# Patient Record
Sex: MALE | Race: OTHER | HISPANIC OR LATINO | Employment: FULL TIME | URBAN - METROPOLITAN AREA
[De-identification: names, ages, dates, MRNs, and addresses within clinical notes are randomized per-mention and may not be internally consistent; named-entity substitution may affect disease eponyms.]

---

## 2022-10-27 ENCOUNTER — OFFICE VISIT (OUTPATIENT)
Dept: INTERNAL MEDICINE CLINIC | Facility: CLINIC | Age: 32
End: 2022-10-27

## 2022-10-27 VITALS
HEIGHT: 71 IN | HEART RATE: 90 BPM | OXYGEN SATURATION: 98 % | BODY MASS INDEX: 27.41 KG/M2 | SYSTOLIC BLOOD PRESSURE: 110 MMHG | WEIGHT: 195.8 LBS | DIASTOLIC BLOOD PRESSURE: 80 MMHG | TEMPERATURE: 97.8 F

## 2022-10-27 DIAGNOSIS — F32.A DEPRESSION, UNSPECIFIED DEPRESSION TYPE: ICD-10-CM

## 2022-10-27 DIAGNOSIS — Z11.4 SCREENING FOR HIV (HUMAN IMMUNODEFICIENCY VIRUS): ICD-10-CM

## 2022-10-27 DIAGNOSIS — Z11.59 ENCOUNTER FOR HEPATITIS C SCREENING TEST FOR LOW RISK PATIENT: ICD-10-CM

## 2022-10-27 DIAGNOSIS — Z23 NEED FOR TDAP VACCINATION: ICD-10-CM

## 2022-10-27 DIAGNOSIS — Z13.228 SCREENING FOR METABOLIC DISORDER: ICD-10-CM

## 2022-10-27 DIAGNOSIS — Z30.09 VASECTOMY EVALUATION: ICD-10-CM

## 2022-10-27 RX ORDER — BUPROPION HYDROCHLORIDE 150 MG/1
150 TABLET ORAL EVERY MORNING
Qty: 90 TABLET | Refills: 3 | Status: SHIPPED | OUTPATIENT
Start: 2022-10-27

## 2022-10-27 RX ORDER — HYDROXYZINE HYDROCHLORIDE 25 MG/1
25 TABLET, FILM COATED ORAL EVERY 6 HOURS PRN
Qty: 30 TABLET | Refills: 0 | Status: SHIPPED | OUTPATIENT
Start: 2022-10-27

## 2022-10-27 NOTE — PROGRESS NOTES
Assessment/Plan:    Depression  Will start Wellbutrin, as need Atarax for anxiety  Advised to not abruptly stop this medication  Follow up in one month or sooner if needed  Vasectomy evaluation  Will place referral to urology  Screening for metabolic disorder  Will get fasting blood work  BMI Counseling: Body mass index is 27 56 kg/m²  The BMI is above normal  Nutrition recommendations include decreasing portion sizes, encouraging healthy choices of fruits and vegetables, decreasing fast food intake, consuming healthier snacks, limiting drinks that contain sugar, moderation in carbohydrate intake, increasing intake of lean protein, reducing intake of saturated and trans fat and reducing intake of cholesterol  Exercise recommendations include moderate physical activity 150 minutes/week and exercising 3-5 times per week  Rationale for BMI follow-up plan is due to patient being overweight or obese  Depression Screening and Follow-up Plan: Patient was screened for depression during today's encounter  They screened negative with a PHQ-2 score of 2  Diagnoses and all orders for this visit:    Screening for metabolic disorder  -     CBC and differential  -     Comprehensive metabolic panel; Future  -     Lipid panel    Screening for HIV (human immunodeficiency virus)  -     HIV 1/2 Antigen/Antibody (4th Generation) w Reflex [facility]; Future    Encounter for hepatitis C screening test for low risk patient  -     Hepatitis C antibody; Future    Vasectomy evaluation  -     Ambulatory Referral to Urology; Future    Depression, unspecified depression type  -     buPROPion (Wellbutrin XL) 150 mg 24 hr tablet; Take 1 tablet (150 mg total) by mouth every morning  -     hydrOXYzine HCL (ATARAX) 25 mg tablet; Take 1 tablet (25 mg total) by mouth every 6 (six) hours as needed for anxiety          Subjective:      Patient ID: Yelena Gomez is a 29 y o  male      Patient presents today to establish care with our practice  Denies any significant past medical history  Current smoker  Social alcohol abuse  He has not been exercising lately  Well balanced diet  He reports that he is having trouble focusing at work, he reports that he has trouble finishing tasks a times  He does reports some depression and the feelings of be overwhelemed at times due to his moms health  This tends to effect his sleep and his attendance at work  The following portions of the patient's history were reviewed and updated as appropriate: allergies, current medications, past family history, past medical history, past social history, past surgical history and problem list     Review of Systems   Constitutional: Negative for activity change, appetite change, chills, diaphoresis and fever  HENT: Negative for congestion, ear discharge, ear pain, postnasal drip, rhinorrhea, sinus pressure, sinus pain and sore throat  Eyes: Negative for pain, discharge, itching and visual disturbance  Respiratory: Negative for cough, chest tightness, shortness of breath and wheezing  Cardiovascular: Negative for chest pain, palpitations and leg swelling  Gastrointestinal: Negative for abdominal pain, constipation, diarrhea, nausea and vomiting  Endocrine: Negative for polydipsia, polyphagia and polyuria  Genitourinary: Negative for difficulty urinating, dysuria and urgency  Musculoskeletal: Negative for arthralgias, back pain and neck pain  Skin: Negative for rash and wound  Neurological: Negative for dizziness, weakness, numbness and headaches  Psychiatric/Behavioral: Positive for dysphoric mood and sleep disturbance  The patient is nervous/anxious  History reviewed  No pertinent past medical history        Current Outpatient Medications:   •  buPROPion (Wellbutrin XL) 150 mg 24 hr tablet, Take 1 tablet (150 mg total) by mouth every morning, Disp: 90 tablet, Rfl: 3  •  hydrOXYzine HCL (ATARAX) 25 mg tablet, Take 1 tablet (25 mg total) by mouth every 6 (six) hours as needed for anxiety, Disp: 30 tablet, Rfl: 0  •  ibuprofen (MOTRIN) 600 mg tablet, Take 1 tablet (600 mg total) by mouth every 6 (six) hours as needed for mild pain or moderate pain  (Patient not taking: No sig reported), Disp: 30 tablet, Rfl: 0    No Known Allergies    Social History   Past Surgical History:   Procedure Laterality Date   • NO PAST SURGERIES       Family History   Problem Relation Age of Onset   • Stroke Mother    • No Known Problems Father    • No Known Problems Sister        Objective:  /80 (BP Location: Left arm, Patient Position: Sitting, Cuff Size: Standard)   Pulse 90   Temp 97 8 °F (36 6 °C) (Temporal)   Ht 5' 10 67" (1 795 m)   Wt 88 8 kg (195 lb 12 8 oz)   SpO2 98%   BMI 27 56 kg/m²     No results found for this or any previous visit (from the past 1344 hour(s))  Physical Exam  Constitutional:       General: He is not in acute distress  Appearance: He is well-developed  He is not diaphoretic  HENT:      Head: Normocephalic and atraumatic  Right Ear: External ear normal       Left Ear: External ear normal       Nose: Nose normal       Mouth/Throat:      Pharynx: No oropharyngeal exudate  Eyes:      General:         Right eye: No discharge  Left eye: No discharge  Conjunctiva/sclera: Conjunctivae normal       Pupils: Pupils are equal, round, and reactive to light  Neck:      Thyroid: No thyromegaly  Cardiovascular:      Rate and Rhythm: Normal rate and regular rhythm  Heart sounds: Normal heart sounds  No murmur heard  No friction rub  No gallop  Pulmonary:      Effort: Pulmonary effort is normal  No respiratory distress  Breath sounds: Normal breath sounds  No stridor  No wheezing or rales  Abdominal:      General: Bowel sounds are normal  There is no distension  Palpations: Abdomen is soft  Tenderness: There is no abdominal tenderness     Musculoskeletal: Cervical back: Normal range of motion and neck supple  Lymphadenopathy:      Cervical: No cervical adenopathy  Skin:     General: Skin is warm and dry  Findings: No erythema or rash  Neurological:      Mental Status: He is alert and oriented to person, place, and time  Psychiatric:         Behavior: Behavior normal          Thought Content:  Thought content normal          Judgment: Judgment normal

## 2022-10-27 NOTE — ASSESSMENT & PLAN NOTE
Will start Wellbutrin, as need Atarax for anxiety  Advised to not abruptly stop this medication  Follow up in one month or sooner if needed

## 2022-11-07 ENCOUNTER — APPOINTMENT (OUTPATIENT)
Dept: LAB | Age: 32
End: 2022-11-07

## 2022-11-07 DIAGNOSIS — Z13.228 SCREENING FOR METABOLIC DISORDER: ICD-10-CM

## 2022-11-07 DIAGNOSIS — Z11.59 ENCOUNTER FOR HEPATITIS C SCREENING TEST FOR LOW RISK PATIENT: ICD-10-CM

## 2022-11-07 DIAGNOSIS — Z11.4 SCREENING FOR HIV (HUMAN IMMUNODEFICIENCY VIRUS): ICD-10-CM

## 2022-11-07 LAB
ALBUMIN SERPL BCP-MCNC: 3.9 G/DL (ref 3.5–5)
ALP SERPL-CCNC: 70 U/L (ref 46–116)
ALT SERPL W P-5'-P-CCNC: 46 U/L (ref 12–78)
ANION GAP SERPL CALCULATED.3IONS-SCNC: 4 MMOL/L (ref 4–13)
AST SERPL W P-5'-P-CCNC: 18 U/L (ref 5–45)
BASOPHILS # BLD AUTO: 0.06 THOUSANDS/ÂΜL (ref 0–0.1)
BASOPHILS NFR BLD AUTO: 1 % (ref 0–1)
BILIRUB SERPL-MCNC: 0.34 MG/DL (ref 0.2–1)
BUN SERPL-MCNC: 11 MG/DL (ref 5–25)
CALCIUM SERPL-MCNC: 9.4 MG/DL (ref 8.3–10.1)
CHLORIDE SERPL-SCNC: 107 MMOL/L (ref 96–108)
CHOLEST SERPL-MCNC: 194 MG/DL
CO2 SERPL-SCNC: 28 MMOL/L (ref 21–32)
CREAT SERPL-MCNC: 1.08 MG/DL (ref 0.6–1.3)
EOSINOPHIL # BLD AUTO: 0.38 THOUSAND/ÂΜL (ref 0–0.61)
EOSINOPHIL NFR BLD AUTO: 6 % (ref 0–6)
ERYTHROCYTE [DISTWIDTH] IN BLOOD BY AUTOMATED COUNT: 11.8 % (ref 11.6–15.1)
GFR SERPL CREATININE-BSD FRML MDRD: 91 ML/MIN/1.73SQ M
GLUCOSE SERPL-MCNC: 86 MG/DL (ref 65–140)
HCT VFR BLD AUTO: 43.2 % (ref 36.5–49.3)
HCV AB SER QL: NORMAL
HDLC SERPL-MCNC: 40 MG/DL
HGB BLD-MCNC: 14.2 G/DL (ref 12–17)
IMM GRANULOCYTES # BLD AUTO: 0.01 THOUSAND/UL (ref 0–0.2)
IMM GRANULOCYTES NFR BLD AUTO: 0 % (ref 0–2)
LDLC SERPL CALC-MCNC: 105 MG/DL (ref 0–100)
LYMPHOCYTES # BLD AUTO: 1.97 THOUSANDS/ÂΜL (ref 0.6–4.47)
LYMPHOCYTES NFR BLD AUTO: 30 % (ref 14–44)
MCH RBC QN AUTO: 30 PG (ref 26.8–34.3)
MCHC RBC AUTO-ENTMCNC: 32.9 G/DL (ref 31.4–37.4)
MCV RBC AUTO: 91 FL (ref 82–98)
MONOCYTES # BLD AUTO: 0.55 THOUSAND/ÂΜL (ref 0.17–1.22)
MONOCYTES NFR BLD AUTO: 8 % (ref 4–12)
NEUTROPHILS # BLD AUTO: 3.65 THOUSANDS/ÂΜL (ref 1.85–7.62)
NEUTS SEG NFR BLD AUTO: 55 % (ref 43–75)
NONHDLC SERPL-MCNC: 154 MG/DL
NRBC BLD AUTO-RTO: 0 /100 WBCS
PLATELET # BLD AUTO: 411 THOUSANDS/UL (ref 149–390)
PMV BLD AUTO: 10 FL (ref 8.9–12.7)
POTASSIUM SERPL-SCNC: 4.2 MMOL/L (ref 3.5–5.3)
PROT SERPL-MCNC: 7.7 G/DL (ref 6.4–8.4)
RBC # BLD AUTO: 4.74 MILLION/UL (ref 3.88–5.62)
SODIUM SERPL-SCNC: 139 MMOL/L (ref 135–147)
TRIGL SERPL-MCNC: 246 MG/DL
WBC # BLD AUTO: 6.62 THOUSAND/UL (ref 4.31–10.16)

## 2022-11-08 LAB — HIV 1+2 AB+HIV1 P24 AG SERPL QL IA: NORMAL

## 2022-11-28 ENCOUNTER — TELEMEDICINE (OUTPATIENT)
Dept: INTERNAL MEDICINE CLINIC | Facility: CLINIC | Age: 32
End: 2022-11-28

## 2022-11-28 DIAGNOSIS — E78.2 MIXED HYPERLIPIDEMIA: ICD-10-CM

## 2022-11-28 DIAGNOSIS — F32.A DEPRESSION, UNSPECIFIED DEPRESSION TYPE: Primary | ICD-10-CM

## 2022-11-28 NOTE — PROGRESS NOTES
Virtual Regular Visit    Verification of patient location:    Patient is located in the following state in which I hold an active license [place]      Assessment/Plan:    Problem List Items Addressed This Visit        Other    Depression - Primary     Well controlled on Wellbutrin, continue with Atarax as needed  Mixed hyperlipidemia     Recommended starting fish oil  Recommend healthy lifestyle choices for your cholesterol  Low fat/low cholesterol diet  Limit/avoid red meat  Eat more lean meat - chicken breast, ground turkey, fish  Exercise 30 mins at least 5 times a week as tolerated  Reason for visit is   Chief Complaint   Patient presents with   • Virtual Regular Visit     1 month follow up review labs done 11/7   • Virtual Regular Visit        Encounter provider SAMEERA Duque    Provider located at [address] [address]      Recent Visits  No visits were found meeting these conditions  Showing recent visits within past 7 days and meeting all other requirements  Today's Visits  Date Type Provider Dept   11/28/22 SAMEERA Torre [facility]   Showing today's visits and meeting all other requirements  Future Appointments  No visits were found meeting these conditions  Showing future appointments within next 150 days and meeting all other requirements       The patient was identified by name and date of birth  Speedy Knox was informed that this is a telemedicine visit and that the visit is being conducted through the [address] [employer] platform  He agrees to proceed     My office door was closed  No one else was in the room  He acknowledged consent and understanding of privacy and security of the video platform   The patient has agreed to participate and understands they can discontinue the visit at any time     Patient is aware this is a billable service  Subjective  Jaime Young is a 29 y o  male    Patient calls in today to review blood work and to follow up on anxiety/depression  Current smoker  Social alcohol abuse  He has not been exercising lately  Well balanced diet  Anxiety/depression-Note from last office visit:He reports that he is having trouble focusing at work, he reports that he has trouble finishing tasks a times  He does reports some depression and the feelings of be overwhelemed at times due to his moms health  This tends to effect his sleep and his attendance at work  He reports that since starting Wellbutrin he has been smoking blasts, his sleep is better and he feels that his focus at work has improved, he is not feeling as overwhelmed,  Denies side effects with this medication  History reviewed  No pertinent past medical history  Past Surgical History:   Procedure Laterality Date   • NO PAST SURGERIES         Current Outpatient Medications   Medication Sig Dispense Refill   • buPROPion (Wellbutrin XL) 150 mg 24 hr tablet Take 1 tablet (150 mg total) by mouth every morning 90 tablet 3   • hydrOXYzine HCL (ATARAX) 25 mg tablet Take 1 tablet (25 mg total) by mouth every 6 (six) hours as needed for anxiety 30 tablet 0     No current facility-administered medications for this visit  No Known Allergies    Review of Systems   Constitutional: Negative for activity change, appetite change, chills, diaphoresis and fever  HENT: Negative for congestion, ear discharge, ear pain, postnasal drip, rhinorrhea, sinus pressure, sinus pain and sore throat  Eyes: Negative for pain, discharge, itching and visual disturbance  Respiratory: Negative for cough, chest tightness, shortness of breath and wheezing  Cardiovascular: Negative for chest pain, palpitations and leg swelling     Gastrointestinal: Negative for abdominal pain, constipation, diarrhea, nausea and vomiting  Endocrine: Negative for polydipsia, polyphagia and polyuria  Genitourinary: Negative for difficulty urinating, dysuria and urgency  Musculoskeletal: Negative for arthralgias, back pain and neck pain  Skin: Negative for rash and wound  Neurological: Negative for dizziness, weakness, numbness and headaches  Psychiatric/Behavioral: Negative for dysphoric mood  The patient is not nervous/anxious  Video Exam    There were no vitals filed for this visit  Physical Exam  Vitals reviewed  HENT:      Head: Normocephalic and atraumatic  Eyes:      General: No scleral icterus  Pulmonary:      Effort: No respiratory distress  Neurological:      Mental Status: He is oriented to person, place, and time  Psychiatric:         Mood and Affect: Mood normal          Behavior: Behavior normal          Thought Content:  Thought content normal          Judgment: Judgment normal           I spent 15 minutes directly with the patient during this visit

## 2022-11-28 NOTE — ASSESSMENT & PLAN NOTE
Recommended starting fish oil  Recommend healthy lifestyle choices for your cholesterol  Low fat/low cholesterol diet  Limit/avoid red meat  Eat more lean meat - chicken breast, ground turkey, fish  Exercise 30 mins at least 5 times a week as tolerated

## 2023-05-19 ENCOUNTER — OFFICE VISIT (OUTPATIENT)
Dept: INTERNAL MEDICINE CLINIC | Facility: CLINIC | Age: 33
End: 2023-05-19

## 2023-05-19 VITALS
TEMPERATURE: 98 F | WEIGHT: 190.4 LBS | HEART RATE: 76 BPM | HEIGHT: 71 IN | BODY MASS INDEX: 26.65 KG/M2 | OXYGEN SATURATION: 98 % | DIASTOLIC BLOOD PRESSURE: 80 MMHG | SYSTOLIC BLOOD PRESSURE: 104 MMHG

## 2023-05-19 DIAGNOSIS — R51.9 ACUTE NONINTRACTABLE HEADACHE, UNSPECIFIED HEADACHE TYPE: Primary | ICD-10-CM

## 2023-05-19 DIAGNOSIS — F90.9 ATTENTION DEFICIT HYPERACTIVITY DISORDER (ADHD), UNSPECIFIED ADHD TYPE: ICD-10-CM

## 2023-05-19 DIAGNOSIS — F51.01 PRIMARY INSOMNIA: ICD-10-CM

## 2023-05-19 DIAGNOSIS — Z87.891 FORMER SMOKER: ICD-10-CM

## 2023-05-19 DIAGNOSIS — F32.A DEPRESSION, UNSPECIFIED DEPRESSION TYPE: ICD-10-CM

## 2023-05-19 RX ORDER — DEXTROAMPHETAMINE SACCHARATE, AMPHETAMINE ASPARTATE MONOHYDRATE, DEXTROAMPHETAMINE SULFATE AND AMPHETAMINE SULFATE 1.25; 1.25; 1.25; 1.25 MG/1; MG/1; MG/1; MG/1
5 CAPSULE, EXTENDED RELEASE ORAL EVERY MORNING
Qty: 30 CAPSULE | Refills: 0 | Status: SHIPPED | OUTPATIENT
Start: 2023-05-19

## 2023-05-19 RX ORDER — BUPROPION HYDROCHLORIDE 300 MG/1
300 TABLET ORAL EVERY MORNING
Qty: 90 TABLET | Refills: 1 | Status: SHIPPED | OUTPATIENT
Start: 2023-05-19

## 2023-05-19 NOTE — PROGRESS NOTES
Assessment/Plan:    Depression  Uncontrolled, increase Wellbutrin to 300 mg tablet daily, continue with Atarax as needed  Attention deficit hyperactivity disorder (ADHD)  Based off screening tool will start Adderall 5 mg extended release  Follow-up with patient in 1 month or sooner if needed  Controlled substance agreement signed while in office today  Primary insomnia  Melatonin at night    Former smoker  Encouraged continued cessation      BMI Counseling: Body mass index is 26 6 kg/m²  The BMI is above normal  Nutrition recommendations include decreasing portion sizes, encouraging healthy choices of fruits and vegetables, decreasing fast food intake, consuming healthier snacks, limiting drinks that contain sugar, moderation in carbohydrate intake, increasing intake of lean protein, reducing intake of saturated and trans fat and reducing intake of cholesterol  Exercise recommendations include moderate physical activity 150 minutes/week and exercising 3-5 times per week  Rationale for BMI follow-up plan is due to patient being overweight or obese  Diagnoses and all orders for this visit:    Acute nonintractable headache, unspecified headache type  -     MRI brain w wo contrast; Future    Depression, unspecified depression type  -     buPROPion (WELLBUTRIN XL) 300 mg 24 hr tablet; Take 1 tablet (300 mg total) by mouth every morning    Attention deficit hyperactivity disorder (ADHD), unspecified ADHD type  -     amphetamine-dextroamphetamine (ADDERALL XR, 5MG,) 5 MG 24 hr capsule; Take 1 capsule (5 mg total) by mouth every morning Max Daily Amount: 5 mg    Primary insomnia    Former smoker          Subjective:      Patient ID: Scott Stoddard is a 29 y o  male  Patient presents today to follow up on anxiety/depression  Former smoker- recently quit  Social alcohol abuse  He has not been exercising lately  Well balanced diet       Anxiety/depression-Note from last office visit:He reports that he is having trouble focusing at work, he reports that he has trouble finishing tasks a times  He does reports some depression and the feelings of be overwhelemed at times due to his moms health  This tends to effect his sleep and his attendance at work  He has been on Wellbutrin without any side effects however he does not feel that his depression is fully controlled at this time  Denies side effects with this medication  Has been getting a sharp pain to the left side of his head daily for the past three months, in the temporal region, he reports that it last about 5-10 seconds and then it goes away  Denies changes to vision  Denies injury to head and denies concussion         The following portions of the patient's history were reviewed and updated as appropriate: allergies, current medications, past family history, past medical history, past social history, past surgical history and problem list     Review of Systems   Constitutional: Negative for activity change, appetite change, chills, diaphoresis and fever  HENT: Negative for congestion, ear discharge, ear pain, postnasal drip, rhinorrhea, sinus pressure, sinus pain and sore throat  Eyes: Negative for pain, discharge, itching and visual disturbance  Respiratory: Negative for cough, chest tightness, shortness of breath and wheezing  Cardiovascular: Negative for chest pain, palpitations and leg swelling  Gastrointestinal: Negative for abdominal pain, constipation, diarrhea, nausea and vomiting  Endocrine: Negative for polydipsia, polyphagia and polyuria  Genitourinary: Negative for difficulty urinating, dysuria and urgency  Musculoskeletal: Negative for arthralgias, back pain and neck pain  Skin: Negative for rash and wound  Neurological: Positive for headaches  Negative for dizziness, weakness and numbness  Psychiatric/Behavioral: Positive for decreased concentration and dysphoric mood  The patient is not nervous/anxious  "         History reviewed  No pertinent past medical history  Current Outpatient Medications:   •  amphetamine-dextroamphetamine (ADDERALL XR, 5MG,) 5 MG 24 hr capsule, Take 1 capsule (5 mg total) by mouth every morning Max Daily Amount: 5 mg, Disp: 30 capsule, Rfl: 0  •  buPROPion (WELLBUTRIN XL) 300 mg 24 hr tablet, Take 1 tablet (300 mg total) by mouth every morning, Disp: 90 tablet, Rfl: 1  •  hydrOXYzine HCL (ATARAX) 25 mg tablet, Take 1 tablet (25 mg total) by mouth every 6 (six) hours as needed for anxiety, Disp: 30 tablet, Rfl: 0    No Known Allergies    Social History   Past Surgical History:   Procedure Laterality Date   • NO PAST SURGERIES       Family History   Problem Relation Age of Onset   • Stroke Mother    • No Known Problems Father    • No Known Problems Sister        Objective:  /80 (BP Location: Left arm, Patient Position: Sitting, Cuff Size: Standard)   Pulse 76   Temp 98 °F (36 7 °C) (Temporal)   Ht 5' 10 95\" (1 802 m)   Wt 86 4 kg (190 lb 6 4 oz)   SpO2 98%   BMI 26 60 kg/m²     No results found for this or any previous visit (from the past 1344 hour(s))  Physical Exam  Constitutional:       General: He is not in acute distress  Appearance: He is well-developed  He is not diaphoretic  HENT:      Head: Normocephalic and atraumatic  Right Ear: External ear normal       Left Ear: External ear normal       Nose: Nose normal       Mouth/Throat:      Pharynx: No oropharyngeal exudate  Eyes:      General:         Right eye: No discharge  Left eye: No discharge  Conjunctiva/sclera: Conjunctivae normal       Pupils: Pupils are equal, round, and reactive to light  Neck:      Thyroid: No thyromegaly  Cardiovascular:      Rate and Rhythm: Normal rate and regular rhythm  Heart sounds: Normal heart sounds  No murmur heard  No friction rub  No gallop  Pulmonary:      Effort: Pulmonary effort is normal  No respiratory distress        Breath " sounds: Normal breath sounds  No stridor  No wheezing or rales  Abdominal:      General: Bowel sounds are normal  There is no distension  Palpations: Abdomen is soft  Tenderness: There is no abdominal tenderness  Musculoskeletal:      Cervical back: Normal range of motion and neck supple  Lymphadenopathy:      Cervical: No cervical adenopathy  Skin:     General: Skin is warm and dry  Findings: No erythema or rash  Neurological:      Mental Status: He is alert and oriented to person, place, and time  Psychiatric:         Behavior: Behavior normal          Thought Content:  Thought content normal          Judgment: Judgment normal

## 2023-05-19 NOTE — ASSESSMENT & PLAN NOTE
Based off screening tool will start Adderall 5 mg extended release  Follow-up with patient in 1 month or sooner if needed  Controlled substance agreement signed while in office today

## 2023-06-14 DIAGNOSIS — F90.9 ATTENTION DEFICIT HYPERACTIVITY DISORDER (ADHD), UNSPECIFIED ADHD TYPE: ICD-10-CM

## 2023-06-14 RX ORDER — DEXTROAMPHETAMINE SACCHARATE, AMPHETAMINE ASPARTATE MONOHYDRATE, DEXTROAMPHETAMINE SULFATE AND AMPHETAMINE SULFATE 1.25; 1.25; 1.25; 1.25 MG/1; MG/1; MG/1; MG/1
5 CAPSULE, EXTENDED RELEASE ORAL EVERY MORNING
Qty: 30 CAPSULE | Refills: 0 | Status: SHIPPED | OUTPATIENT
Start: 2023-06-14

## 2023-06-14 NOTE — TELEPHONE ENCOUNTER
----- Message from [id number] [address]  Deepika [place] sent at 6/14/2023 10:20 AM EDT -----  Regarding: Medication refills   Contact: [phone]  Jonah Reyes, I was wondering if my Adderall prescription is set up for refills? I only ask because the bottle says zero refills and I have 3 days left of supply  I think this has massively helped me in my personal and work life  So if possible I would like to continue taking this medication       Thank you,   Warden Beltran

## 2023-07-14 ENCOUNTER — TELEMEDICINE (OUTPATIENT)
Dept: INTERNAL MEDICINE CLINIC | Facility: CLINIC | Age: 33
End: 2023-07-14
Payer: COMMERCIAL

## 2023-07-14 DIAGNOSIS — F90.9 ATTENTION DEFICIT HYPERACTIVITY DISORDER (ADHD), UNSPECIFIED ADHD TYPE: ICD-10-CM

## 2023-07-14 DIAGNOSIS — F32.A DEPRESSION, UNSPECIFIED DEPRESSION TYPE: Primary | ICD-10-CM

## 2023-07-14 PROCEDURE — 99213 OFFICE O/P EST LOW 20 MIN: CPT | Performed by: NURSE PRACTITIONER

## 2023-07-14 RX ORDER — DEXTROAMPHETAMINE SACCHARATE, AMPHETAMINE ASPARTATE MONOHYDRATE, DEXTROAMPHETAMINE SULFATE AND AMPHETAMINE SULFATE 2.5; 2.5; 2.5; 2.5 MG/1; MG/1; MG/1; MG/1
10 CAPSULE, EXTENDED RELEASE ORAL EVERY MORNING
Qty: 30 CAPSULE | Refills: 0 | Status: SHIPPED | OUTPATIENT
Start: 2023-07-14

## 2023-07-14 NOTE — ASSESSMENT & PLAN NOTE
Improving, however will increase Adderall to 10 mg tablet extended release  Follow-up in 3 months or sooner if needed  Controlled substance agreement on file

## 2023-07-14 NOTE — PROGRESS NOTES
Virtual Regular Visit    Verification of patient location:    Patient is located at Home in the following state in which I hold an active license [place]      Assessment/Plan:    Problem List Items Addressed This Visit        Other    Depression - Primary     Controlled on Wellbutrin half milligram tablet daily 300 mg daily, continue with Atarax as needed         Attention deficit hyperactivity disorder (ADHD)     Improving, however will increase Adderall to 10 mg tablet extended release  Follow-up in 3 months or sooner if needed  Controlled substance agreement on file                  Reason for visit is   Chief Complaint   Patient presents with   • Virtual regaular visit     1 month follow adhd and antidepressants   • Virtual Regular Visit        Encounter provider SAMEERA Montejo    Provider located at [address] [place] [address].  [address]      Recent Visits  No visits were found meeting these conditions. Showing recent visits within past 7 days and meeting all other requirements  Today's Visits  Date Type Provider Dept   07/14/23 4093 SAMEERA Kothari [facility]   Showing today's visits and meeting all other requirements  Future Appointments  No visits were found meeting these conditions. Showing future appointments within next 150 days and meeting all other requirements       The patient was identified by name and date of birth. Oswald Marcum was informed that this is a telemedicine visit and that the visit is being conducted through the [employer]. He agrees to proceed. .  My office door was closed. No one else was in the room. He acknowledged consent and understanding of privacy and security of the video platform. The patient has agreed to participate and understands they can discontinue the visit at any time.     Patient is aware this is a [facility] service. Subjective  Linh Esquivel is a 29 y.o. male  . Patient presents today to follow up on anxiety/depression. Former smoker- recently quit  Social alcohol abuse. He has not been exercising lately. Well balanced diet. Anxiety/depression-Note from last office visit:He reports that he is having trouble focusing at work, he reports that he has trouble finishing tasks a times. He does reports some depression and the feelings of be overwhelemed at times due to his moms health. This tends to effect his sleep and his attendance at work. Last office visit we increased his Wellbutrin to 300 mg and in regards to depression and anxiety he feels well controlled, we had also started him on Adderall 5 mg tablet daily, he reports a decrease in his appetite however is still eating appropriately, denies any insomnia    Reports overall since starting Adderall he is notices significant improvement in staying focused however he noticed that since he has been taking it and has been not as effective    He reports that since taking the Wellbutrin consistently he is no longer getting that sharp pain to the left side of his head       History reviewed. No pertinent past medical history. Past Surgical History:   Procedure Laterality Date   • NO PAST SURGERIES         Current Outpatient Medications   Medication Sig Dispense Refill   • buPROPion (WELLBUTRIN XL) 300 mg 24 hr tablet Take 1 tablet (300 mg total) by mouth every morning 90 tablet 1   • hydrOXYzine HCL (ATARAX) 25 mg tablet Take 1 tablet (25 mg total) by mouth every 6 (six) hours as needed for anxiety 30 tablet 0     No current facility-administered medications for this visit. No Known Allergies    Review of Systems   Constitutional: Negative for activity change, appetite change, chills, diaphoresis and fever.    HENT: Negative for congestion, ear discharge, ear pain, postnasal drip, rhinorrhea, sinus pressure, sinus pain and sore throat. Eyes: Negative for pain, discharge, itching and visual disturbance. Respiratory: Negative for cough, chest tightness, shortness of breath and wheezing. Cardiovascular: Negative for chest pain, palpitations and leg swelling. Gastrointestinal: Negative for abdominal pain, constipation, diarrhea, nausea and vomiting. Endocrine: Negative for polydipsia, polyphagia and polyuria. Genitourinary: Negative for difficulty urinating, dysuria and urgency. Musculoskeletal: Negative for arthralgias, back pain and neck pain. Skin: Negative for rash and wound. Neurological: Negative for dizziness, weakness, numbness and headaches. Psychiatric/Behavioral: Negative for decreased concentration and dysphoric mood. The patient is not nervous/anxious. Video Exam    There were no vitals filed for this visit.     Physical Exam     Visit Time  Total Visit Duration: 15

## 2023-08-17 ENCOUNTER — PATIENT MESSAGE (OUTPATIENT)
Dept: INTERNAL MEDICINE CLINIC | Facility: CLINIC | Age: 33
End: 2023-08-17

## 2023-08-17 DIAGNOSIS — F90.9 ATTENTION DEFICIT HYPERACTIVITY DISORDER (ADHD), UNSPECIFIED ADHD TYPE: ICD-10-CM

## 2023-08-18 RX ORDER — DEXTROAMPHETAMINE SACCHARATE, AMPHETAMINE ASPARTATE MONOHYDRATE, DEXTROAMPHETAMINE SULFATE AND AMPHETAMINE SULFATE 2.5; 2.5; 2.5; 2.5 MG/1; MG/1; MG/1; MG/1
10 CAPSULE, EXTENDED RELEASE ORAL EVERY MORNING
Qty: 30 CAPSULE | Refills: 0 | Status: SHIPPED | OUTPATIENT
Start: 2023-08-18

## 2023-09-16 DIAGNOSIS — F90.9 ATTENTION DEFICIT HYPERACTIVITY DISORDER (ADHD), UNSPECIFIED ADHD TYPE: ICD-10-CM

## 2023-09-18 RX ORDER — DEXTROAMPHETAMINE SACCHARATE, AMPHETAMINE ASPARTATE MONOHYDRATE, DEXTROAMPHETAMINE SULFATE AND AMPHETAMINE SULFATE 2.5; 2.5; 2.5; 2.5 MG/1; MG/1; MG/1; MG/1
10 CAPSULE, EXTENDED RELEASE ORAL EVERY MORNING
Qty: 30 CAPSULE | Refills: 0 | Status: SHIPPED | OUTPATIENT
Start: 2023-09-18

## 2023-10-13 DIAGNOSIS — F90.9 ATTENTION DEFICIT HYPERACTIVITY DISORDER (ADHD), UNSPECIFIED ADHD TYPE: ICD-10-CM

## 2023-10-23 RX ORDER — DEXTROAMPHETAMINE SACCHARATE, AMPHETAMINE ASPARTATE MONOHYDRATE, DEXTROAMPHETAMINE SULFATE AND AMPHETAMINE SULFATE 2.5; 2.5; 2.5; 2.5 MG/1; MG/1; MG/1; MG/1
10 CAPSULE, EXTENDED RELEASE ORAL EVERY MORNING
Qty: 30 CAPSULE | Refills: 0 | Status: SHIPPED | OUTPATIENT
Start: 2023-10-23

## 2023-11-22 DIAGNOSIS — F90.9 ATTENTION DEFICIT HYPERACTIVITY DISORDER (ADHD), UNSPECIFIED ADHD TYPE: ICD-10-CM

## 2023-11-24 NOTE — TELEPHONE ENCOUNTER
Last office visit 7/14  Next Office Visit not scheduled sent patient message in my chart to schedule  a follow up appointment.

## 2023-11-27 RX ORDER — DEXTROAMPHETAMINE SACCHARATE, AMPHETAMINE ASPARTATE MONOHYDRATE, DEXTROAMPHETAMINE SULFATE AND AMPHETAMINE SULFATE 2.5; 2.5; 2.5; 2.5 MG/1; MG/1; MG/1; MG/1
10 CAPSULE, EXTENDED RELEASE ORAL EVERY MORNING
Qty: 30 CAPSULE | Refills: 0 | Status: SHIPPED | OUTPATIENT
Start: 2023-11-27

## 2023-12-01 ENCOUNTER — OFFICE VISIT (OUTPATIENT)
Age: 33
End: 2023-12-01
Payer: COMMERCIAL

## 2023-12-01 VITALS
OXYGEN SATURATION: 99 % | DIASTOLIC BLOOD PRESSURE: 70 MMHG | SYSTOLIC BLOOD PRESSURE: 120 MMHG | BODY MASS INDEX: 23.8 KG/M2 | HEIGHT: 71 IN | TEMPERATURE: 98.4 F | WEIGHT: 170 LBS | HEART RATE: 80 BPM

## 2023-12-01 DIAGNOSIS — E78.2 MIXED HYPERLIPIDEMIA: ICD-10-CM

## 2023-12-01 DIAGNOSIS — Z30.09 VASECTOMY EVALUATION: Primary | ICD-10-CM

## 2023-12-01 DIAGNOSIS — F90.9 ATTENTION DEFICIT HYPERACTIVITY DISORDER (ADHD), UNSPECIFIED ADHD TYPE: ICD-10-CM

## 2023-12-01 DIAGNOSIS — F32.A DEPRESSION, UNSPECIFIED DEPRESSION TYPE: ICD-10-CM

## 2023-12-01 PROCEDURE — 99214 OFFICE O/P EST MOD 30 MIN: CPT | Performed by: NURSE PRACTITIONER

## 2023-12-01 RX ORDER — BUPROPION HYDROCHLORIDE 300 MG/1
300 TABLET ORAL EVERY MORNING
Qty: 90 TABLET | Refills: 1 | Status: SHIPPED | OUTPATIENT
Start: 2023-12-01

## 2023-12-01 RX ORDER — BENZONATATE 200 MG/1
CAPSULE ORAL
COMMUNITY
Start: 2023-11-15

## 2023-12-01 NOTE — ASSESSMENT & PLAN NOTE
Recommended starting fish oil. Recommend healthy lifestyle choices for your cholesterol. Low fat/low cholesterol diet. Limit/avoid red meat. Eat more lean meat - chicken breast, ground turkey, fish. Exercise 30 mins at least 5 times a week as tolerated.

## 2023-12-01 NOTE — ASSESSMENT & PLAN NOTE
Controlled on Adderall to 10 mg tablet extended release  Follow-up in 6 months or sooner if needed  Controlled substance agreement on file

## 2023-12-01 NOTE — PROGRESS NOTES
Assessment/Plan:    Mixed hyperlipidemia  Recommended starting fish oil. Recommend healthy lifestyle choices for your cholesterol. Low fat/low cholesterol diet. Limit/avoid red meat. Eat more lean meat - chicken breast, ground turkey, fish. Exercise 30 mins at least 5 times a week as tolerated. Depression  Controlled on Wellbutrin 300 mg daily, continue with Atarax as needed    Attention deficit hyperactivity disorder (ADHD)  Controlled on Adderall to 10 mg tablet extended release  Follow-up in 6 months or sooner if needed  Controlled substance agreement on file               Diagnoses and all orders for this visit:    Vasectomy evaluation    Depression, unspecified depression type  -     buPROPion (WELLBUTRIN XL) 300 mg 24 hr tablet; Take 1 tablet (300 mg total) by mouth every morning    Mixed hyperlipidemia    Attention deficit hyperactivity disorder (ADHD), unspecified ADHD type    Other orders  -     benzonatate (TESSALON) 200 MG capsule; TAKE 1 CAPSULE BY MOUTH THREE TIMES A DAY AS NEEDED FOR COUGH (Patient not taking: Reported on 12/1/2023)          Subjective:      Patient ID: Sirena Maldonado is a 34 y.o. male. Patient presents today to follow up on anxiety/depression. Former smoker- recently quit  Social alcohol abuse. He has not been exercising lately. Well balanced diet. Anxiety/depression-Note from last office visit:He reports that he is having trouble focusing at work, he reports that he has trouble finishing tasks a times. He does reports some depression and the feelings of be overwhelemed at times due to his moms health. This tends to effect his sleep and his attendance at work.     Last office visit we increased his Wellbutrin to 300 mg and in regards to depression and anxiety he feels well controlled, we had also started him on Adderall 5 mg tablet daily, he reports a decrease in his appetite however is still eating appropriately, denies any insomnia    Reports overall since starting Adderall he is notices significant improvement in staying focused however he noticed that since he has been taking it and has been not as effective            The following portions of the patient's history were reviewed and updated as appropriate: allergies, current medications, past family history, past medical history, past social history, past surgical history, and problem list.    Review of Systems   Constitutional:  Negative for activity change, appetite change, chills, diaphoresis and fever. HENT:  Negative for congestion, ear discharge, ear pain, postnasal drip, rhinorrhea, sinus pressure, sinus pain and sore throat. Eyes:  Negative for pain, discharge, itching and visual disturbance. Respiratory:  Negative for cough, chest tightness, shortness of breath and wheezing. Cardiovascular:  Negative for chest pain, palpitations and leg swelling. Gastrointestinal:  Negative for abdominal pain, constipation, diarrhea, nausea and vomiting. Endocrine: Negative for polydipsia, polyphagia and polyuria. Genitourinary:  Negative for difficulty urinating, dysuria and urgency. Musculoskeletal:  Negative for arthralgias, back pain and neck pain. Skin:  Negative for rash and wound. Neurological:  Negative for dizziness, weakness, numbness and headaches. History reviewed. No pertinent past medical history.       Current Outpatient Medications:     amphetamine-dextroamphetamine (ADDERALL XR, 10MG,) 10 MG 24 hr capsule, Take 1 capsule (10 mg total) by mouth every morning Max Daily Amount: 10 mg, Disp: 30 capsule, Rfl: 0    buPROPion (WELLBUTRIN XL) 300 mg 24 hr tablet, Take 1 tablet (300 mg total) by mouth every morning, Disp: 90 tablet, Rfl: 1    hydrOXYzine HCL (ATARAX) 25 mg tablet, Take 1 tablet (25 mg total) by mouth every 6 (six) hours as needed for anxiety, Disp: 30 tablet, Rfl: 0    benzonatate (TESSALON) 200 MG capsule, TAKE 1 CAPSULE BY MOUTH THREE TIMES A DAY AS NEEDED FOR COUGH (Patient not taking: Reported on 12/1/2023), Disp: , Rfl:     No Known Allergies    Social History   Past Surgical History:   Procedure Laterality Date    NO PAST SURGERIES       Family History   Problem Relation Age of Onset    Stroke Mother     No Known Problems Father     No Known Problems Sister        Objective:  /70 (BP Location: Left arm, Patient Position: Sitting, Cuff Size: Standard)   Pulse 80   Temp 98.4 °F (36.9 °C) (Temporal)   Ht 5' 10.95" (1.802 m)   Wt 77.1 kg (170 lb)   SpO2 99%   BMI 23.74 kg/m²     No results found for this or any previous visit (from the past 1344 hour(s)). Physical Exam  Constitutional:       General: He is not in acute distress. Appearance: He is well-developed. He is not diaphoretic. HENT:      Head: Normocephalic and atraumatic. Right Ear: External ear normal.      Left Ear: External ear normal.      Nose: Nose normal.      Mouth/Throat:      Mouth: Mucous membranes are moist.      Pharynx: No oropharyngeal exudate or posterior oropharyngeal erythema. Eyes:      General:         Right eye: No discharge. Left eye: No discharge. Conjunctiva/sclera: Conjunctivae normal.      Pupils: Pupils are equal, round, and reactive to light. Neck:      Thyroid: No thyromegaly. Cardiovascular:      Rate and Rhythm: Normal rate and regular rhythm. Heart sounds: Normal heart sounds. No murmur heard. No friction rub. No gallop. Pulmonary:      Effort: Pulmonary effort is normal. No respiratory distress. Breath sounds: Normal breath sounds. No stridor. No wheezing or rales. Abdominal:      General: Bowel sounds are normal. There is no distension. Palpations: Abdomen is soft. Tenderness: There is no abdominal tenderness. Musculoskeletal:      Cervical back: Normal range of motion and neck supple. Lymphadenopathy:      Cervical: No cervical adenopathy. Skin:     General: Skin is warm and dry.       Findings: No erythema or rash. Neurological:      Mental Status: He is alert and oriented to person, place, and time. Psychiatric:         Behavior: Behavior normal.         Thought Content:  Thought content normal.         Judgment: Judgment normal.

## 2024-01-03 DIAGNOSIS — F90.9 ATTENTION DEFICIT HYPERACTIVITY DISORDER (ADHD), UNSPECIFIED ADHD TYPE: ICD-10-CM

## 2024-01-03 RX ORDER — DEXTROAMPHETAMINE SACCHARATE, AMPHETAMINE ASPARTATE MONOHYDRATE, DEXTROAMPHETAMINE SULFATE AND AMPHETAMINE SULFATE 2.5; 2.5; 2.5; 2.5 MG/1; MG/1; MG/1; MG/1
10 CAPSULE, EXTENDED RELEASE ORAL EVERY MORNING
Qty: 30 CAPSULE | Refills: 0 | Status: SHIPPED | OUTPATIENT
Start: 2024-01-03

## 2024-02-02 DIAGNOSIS — F90.9 ATTENTION DEFICIT HYPERACTIVITY DISORDER (ADHD), UNSPECIFIED ADHD TYPE: ICD-10-CM

## 2024-02-02 RX ORDER — DEXTROAMPHETAMINE SACCHARATE, AMPHETAMINE ASPARTATE MONOHYDRATE, DEXTROAMPHETAMINE SULFATE AND AMPHETAMINE SULFATE 2.5; 2.5; 2.5; 2.5 MG/1; MG/1; MG/1; MG/1
10 CAPSULE, EXTENDED RELEASE ORAL EVERY MORNING
Qty: 30 CAPSULE | Refills: 0 | Status: SHIPPED | OUTPATIENT
Start: 2024-02-02

## 2024-03-05 DIAGNOSIS — F90.9 ATTENTION DEFICIT HYPERACTIVITY DISORDER (ADHD), UNSPECIFIED ADHD TYPE: ICD-10-CM

## 2024-03-05 RX ORDER — DEXTROAMPHETAMINE SACCHARATE, AMPHETAMINE ASPARTATE MONOHYDRATE, DEXTROAMPHETAMINE SULFATE AND AMPHETAMINE SULFATE 2.5; 2.5; 2.5; 2.5 MG/1; MG/1; MG/1; MG/1
10 CAPSULE, EXTENDED RELEASE ORAL EVERY MORNING
Qty: 30 CAPSULE | Refills: 0 | Status: SHIPPED | OUTPATIENT
Start: 2024-03-05

## 2024-03-18 ENCOUNTER — OFFICE VISIT (OUTPATIENT)
Age: 34
End: 2024-03-18
Payer: COMMERCIAL

## 2024-03-18 VITALS
BODY MASS INDEX: 22.88 KG/M2 | HEART RATE: 86 BPM | DIASTOLIC BLOOD PRESSURE: 68 MMHG | OXYGEN SATURATION: 97 % | TEMPERATURE: 98.3 F | SYSTOLIC BLOOD PRESSURE: 110 MMHG | HEIGHT: 71 IN | WEIGHT: 163.4 LBS

## 2024-03-18 DIAGNOSIS — E04.9 ENLARGED THYROID: ICD-10-CM

## 2024-03-18 DIAGNOSIS — Z13.228 SCREENING FOR METABOLIC DISORDER: ICD-10-CM

## 2024-03-18 DIAGNOSIS — F32.A DEPRESSION, UNSPECIFIED DEPRESSION TYPE: Primary | ICD-10-CM

## 2024-03-18 DIAGNOSIS — R79.89 ABNORMAL TSH: ICD-10-CM

## 2024-03-18 DIAGNOSIS — E78.2 MIXED HYPERLIPIDEMIA: ICD-10-CM

## 2024-03-18 DIAGNOSIS — F51.01 PRIMARY INSOMNIA: ICD-10-CM

## 2024-03-18 DIAGNOSIS — R63.4 WEIGHT LOSS: ICD-10-CM

## 2024-03-18 DIAGNOSIS — Z87.891 FORMER SMOKER: ICD-10-CM

## 2024-03-18 DIAGNOSIS — E78.5 HYPERLIPIDEMIA, UNSPECIFIED HYPERLIPIDEMIA TYPE: ICD-10-CM

## 2024-03-18 DIAGNOSIS — F90.9 ATTENTION DEFICIT HYPERACTIVITY DISORDER (ADHD), UNSPECIFIED ADHD TYPE: ICD-10-CM

## 2024-03-18 DIAGNOSIS — Z00.00 ANNUAL PHYSICAL EXAM: ICD-10-CM

## 2024-03-18 DIAGNOSIS — E55.9 VITAMIN D DEFICIENCY: ICD-10-CM

## 2024-03-18 PROCEDURE — 99214 OFFICE O/P EST MOD 30 MIN: CPT | Performed by: NURSE PRACTITIONER

## 2024-03-18 PROCEDURE — 99395 PREV VISIT EST AGE 18-39: CPT | Performed by: NURSE PRACTITIONER

## 2024-03-18 RX ORDER — ESCITALOPRAM OXALATE 10 MG/1
10 TABLET ORAL DAILY
Qty: 90 TABLET | Refills: 1 | Status: SHIPPED | OUTPATIENT
Start: 2024-03-18 | End: 2025-03-13

## 2024-03-18 NOTE — ASSESSMENT & PLAN NOTE
-due for fasting blood work   -Continue with well-balanced diet, encouraged daily exercise  -he is up-to-date with vaccinations  -Encourage monthly self testicular examination  -Follow-up in 1 year for annual physical or sooner if needed

## 2024-03-18 NOTE — ASSESSMENT & PLAN NOTE
Controlled on Adderall to 10 mg tablet extended release  Follow-up in 6 months or sooner if needed  Controlled substance agreement on file     Consider trial off of this medication if patient continues to lose weight and has decreased appetite

## 2024-03-18 NOTE — PROGRESS NOTES
[facility] PRIMARY CARE [place]  Well Adult Male Physical Visit  Patient ID: Jaylen Angel    : 1990  Age/Gender: 33 y.o. male     DATE: 3/18/2024      Assessment/Plan:    Former smoker  Encouraged continued cessation    Depression  -Will trial off of Wellbutrin, start Lexapro, discussed tapering  -Follow-up in 1 month or sooner if needed    Attention deficit hyperactivity disorder (ADHD)  Controlled on Adderall to 10 mg tablet extended release  Follow-up in 6 months or sooner if needed  Controlled substance agreement on file     Consider trial off of this medication if patient continues to lose weight and has decreased appetite    Primary insomnia  Melatonin at night    Mixed hyperlipidemia  Recommended starting fish oil. Recommend healthy lifestyle choices for your cholesterol.  Low fat/low cholesterol diet.  Limit/avoid red meat.  Eat more lean meat - chicken breast, ground turkey, fish.  Exercise 30 mins at least 5 times a week as tolerated.        Weight loss  -May be secondary to depression, also may be side effect of medications  -Will get fasting blood work   -encouraged to eat a well-balanced diet      Annual physical exam  -due for fasting blood work   -Continue with well-balanced diet, encouraged daily exercise  -he is up-to-date with vaccinations  -Encourage monthly self testicular examination  -Follow-up in 1 year for annual physical or sooner if needed    Depression Screening and Follow-up Plan: Patient's depression screening was positive with a PHQ-9 score of 12. Patient assessed for underlying major depression. Brief counseling provided and recommend additional follow-up/re-evaluation next office visit.          Diagnoses and all orders for this visit:    Depression, unspecified depression type  -     escitalopram (LEXAPRO) 10 mg tablet; Take 1 tablet (10 mg total) by mouth daily    Screening for metabolic disorder  -     Comprehensive  metabolic panel; Future  -     CBC and differential  -     Lipid panel    Hyperlipidemia, unspecified hyperlipidemia type  -     Lipid panel    Vitamin D deficiency  -     Vitamin D 25 hydroxy    Weight loss  -     Vitamin B12; Future    Enlarged thyroid  -     US thyroid; Future    Abnormal TSH  -     TSH, 3rd generation with Free T4 reflex    Former smoker    Attention deficit hyperactivity disorder (ADHD), unspecified ADHD type    Primary insomnia    Mixed hyperlipidemia    Annual physical exam          Subjective:     Jaylen Angel is a 33 y.o. male who presents to the office on 3/18/2024 for a health maintenance physical.    Patient presents today to follow up on anxiety/depression, ADHD and for annual physical.    Former smoker- recently quit  Social alcohol abuse.     Anxiety/depression-Note from last office visit:He reports that he is having trouble focusing at work, he reports that he has trouble finishing tasks a times. He does reports some depression and the feelings of be overwhelemed at times due to his moms health.  This tends to effect his sleep and his attendance at work.    Has lost about 30lbs since last office visit, he reports decreased appetite since about August, he reports that he has been more depressed lately    Last office visit we increased his Wellbutrin to 300 mg and in regards to depression and anxiety he feels uncontrolled, we had also started him on Adderall 10 mg tablet daily, he reports appetite decreased and insomnia  Reports overall since starting Adderall he is notices significant improvement     He reports a sharp pain at the bottom left of his head, he reports that it comes and goes for the last month, denies injury  He reports more fatigued lately, lightheaded with exertion, for about the past two months   He reports epigastric discomfort with fatigue, denies crushing chest pain, he does report some SOB              The following portions of the patient's history were  "reviewed and updated as appropriate: allergies, current medications, past family history, past medical history, past social history, past surgical history, and problem list.    Pt reports overall health:  \"Decent\"  Last Physical: two years ago    Healthy Diet:  well balanced, but lost about 30 pounds  Routine Exercise:  denies  Weight Concerns:  has lost weight    Problems with vision:  denies, wears glasses  Last Eye Exam: one year ago    Problems with Hearing:  denies    Routine Dental Exams:  every 6 months    Smoking History:  denies  ETOH Use:  ocassionally   Illegal Drug Use: denies   Caffeine Use:  one cup a day     Reproductive Health:    Sexually Active:  currently  Testicular self exam:  denies    Depression Screening:  PHQ-2/9 Depression Screening    Little interest or pleasure in doing things: 1 - several days  Feeling down, depressed, or hopeless: 1 - several days  Trouble falling or staying asleep, or sleeping too much: 2 - more than half the days  Feeling tired or having little energy: 2 - more than half the days  Poor appetite or overeatin - more than half the days  Feeling bad about yourself - or that you are a failure or have let yourself or your family down: 1 - several days  Trouble concentrating on things, such as reading the newspaper or watching television: 2 - more than half the days  Moving or speaking so slowly that other people could have noticed. Or the opposite - being so fidgety or restless that you have been moving around a lot more than usual: 1 - several days  Thoughts that you would be better off dead, or of hurting yourself in some way: 0 - not at all  PHQ-9 Score: 12  PHQ-9 Interpretation: Moderate depression           Family History of Colon CA:  denies        Last Labs:      Review of Systems   Constitutional:  Positive for fatigue. Negative for activity change, appetite change, chills, diaphoresis and fever.   HENT:  Negative for congestion, ear discharge, ear pain, " postnasal drip, rhinorrhea, sinus pressure, sinus pain and sore throat.    Eyes:  Negative for pain, discharge, itching and visual disturbance.   Respiratory:  Negative for cough, chest tightness, shortness of breath and wheezing.    Cardiovascular:  Negative for chest pain, palpitations and leg swelling.   Gastrointestinal:  Negative for abdominal pain, constipation, diarrhea, nausea and vomiting.   Endocrine: Negative for polydipsia, polyphagia and polyuria.   Genitourinary:  Negative for difficulty urinating, dysuria and urgency.   Musculoskeletal:  Positive for arthralgias. Negative for back pain and neck pain.   Skin:  Negative for rash and wound.   Neurological:  Negative for dizziness, weakness, numbness and headaches.   Psychiatric/Behavioral:  Positive for dysphoric mood. The patient is nervous/anxious.          Patient Active Problem List   Diagnosis    Depression    Annual physical exam    Mixed hyperlipidemia    Former smoker    Primary insomnia    Attention deficit hyperactivity disorder (ADHD)    Weight loss    Enlarged thyroid       History reviewed. No pertinent past medical history.    Past Surgical History:   Procedure Laterality Date    NO PAST SURGERIES           Current Outpatient Medications:     amphetamine-dextroamphetamine (ADDERALL XR, 10MG,) 10 MG 24 hr capsule, Take 1 capsule (10 mg total) by mouth every morning Max Daily Amount: 10 mg, Disp: 30 capsule, Rfl: 0    escitalopram (LEXAPRO) 10 mg tablet, Take 1 tablet (10 mg total) by mouth daily, Disp: 90 tablet, Rfl: 1    hydrOXYzine HCL (ATARAX) 25 mg tablet, Take 1 tablet (25 mg total) by mouth every 6 (six) hours as needed for anxiety, Disp: 30 tablet, Rfl: 0    benzonatate (TESSALON) 200 MG capsule, , Disp: , Rfl:     No Known Allergies    Social History     Socioeconomic History    Marital status: Single     Spouse name: None    Number of children: None    Years of education: None    Highest education level: None   Occupational History     None   Tobacco Use    Smoking status: Former     Current packs/day: 0.00     Types: Cigarettes     Start date: 2017     Quit date: 1/21/2021     Years since quitting: 3.1    Smokeless tobacco: Never   Vaping Use    Vaping status: Former    Start date: 1/1/2021    Quit date: 12/1/2022    Substances: Nicotine   Substance and Sexual Activity    Alcohol use: Yes    Drug use: Yes     Types: Marijuana    Sexual activity: None   Other Topics Concern    None   Social History Narrative    None     Social Determinants of Health     Financial Resource Strain: Not on file   Food Insecurity: Not on file   Transportation Needs: Not on file   Physical Activity: Not on file   Stress: Not on file   Social Connections: Not on file   Intimate Partner Violence: Not on file   Housing Stability: Not on file       Family History   Problem Relation Age of Onset    Stroke Mother     No Known Problems Father     No Known Problems Sister        Immunization History   Administered Date(s) Administered    COVID-19 PFIZER VACCINE 0.3 ML IM 04/07/2021, 04/28/2021, 01/21/2022    DTP [birth year], 07/22/1993    Hep B, Adolescent or Pediatric 1990, 1990, 07/22/1993    HiB 1990, 1990, 07/22/1993, 12/16/1993    Hib (PRP-T) 1990, 1992, 07/22/1993, 12/16/1993    INFLUENZA 01/21/2022    MMR 12/16/1993    Meningococcal MCV4, Unspecified 07/13/2009    OPV 1990, 1990    Tdap 07/13/2009, 10/27/2022, 10/27/2022    Tuberculin Skin Test-PPD Intradermal 03/15/2021        Health Maintenance   Topic Date Due    IPV Vaccine (3 of 3 - 4-dose series) 07/18/1996    Influenza Vaccine (1) 09/01/2023    COVID-19 Vaccine (4 - 2023-24 season) 09/01/2023    Depression Screening  10/27/2023    Annual Physical  03/18/2025    DTaP,Tdap,and Td Vaccines (7 - Td or Tdap) 10/27/2032    Zoster Vaccine (1 of 2) 07/18/2042    HIV Screening  Completed    Hepatitis C Screening  Completed    HIB Vaccine  Completed     "Meningococcal ACWY Vaccine  Completed    Pneumococcal Vaccine: Pediatrics (0 to 5 Years) and At-Risk Patients (6 to 64 Years)  Aged Out    Hepatitis A Vaccine  Aged Out    HPV Vaccine  Aged Out       Objective:  Vitals:    03/18/24 0808   BP: 110/68   BP Location: Left arm   Patient Position: Sitting   Cuff Size: Standard   Pulse: 86   Temp: 98.3 °F (36.8 °C)   TempSrc: Temporal   SpO2: 97%   Weight: 74.1 kg (163 lb 6.4 oz)   Height: 5' 10.55\" (1.792 m)     Wt Readings from Last 3 Encounters:   03/18/24 74.1 kg (163 lb 6.4 oz)   12/01/23 77.1 kg (170 lb)   05/19/23 86.4 kg (190 lb 6.4 oz)     Body mass index is 23.08 kg/m².  No results found.       Physical Exam  Constitutional:       General: He is not in acute distress.     Appearance: He is well-developed. He is not diaphoretic.   HENT:      Head: Normocephalic and atraumatic.      Right Ear: External ear normal.      Left Ear: External ear normal.      Nose: Nose normal.      Mouth/Throat:      Mouth: Mucous membranes are moist.      Pharynx: No oropharyngeal exudate or posterior oropharyngeal erythema.   Eyes:      General:         Right eye: No discharge.         Left eye: No discharge.      Conjunctiva/sclera: Conjunctivae normal.      Pupils: Pupils are equal, round, and reactive to light.   Neck:      Thyroid: No thyromegaly.   Cardiovascular:      Rate and Rhythm: Normal rate and regular rhythm.      Heart sounds: Normal heart sounds. No murmur heard.     No friction rub. No gallop.   Pulmonary:      Effort: Pulmonary effort is normal. No respiratory distress.      Breath sounds: Normal breath sounds. No stridor. No wheezing or rales.   Abdominal:      General: Bowel sounds are normal. There is no distension.      Palpations: Abdomen is soft.      Tenderness: There is no abdominal tenderness.   Musculoskeletal:      Cervical back: Normal range of motion and neck supple.   Lymphadenopathy:      Cervical: No cervical adenopathy.   Skin:     General: Skin is " warm and dry.      Findings: No erythema or rash.   Neurological:      Mental Status: He is alert and oriented to person, place, and time.   Psychiatric:         Behavior: Behavior normal.         Thought Content: Thought content normal.         Judgment: Judgment normal.             Future Appointments   Date Time Provider Department Center   4/19/2024  8:00 AM SAMEERA Olivia [facility]   3/21/2025  8:00 AM SAMEERA Olivia [facility]       SAMEERA Olivia  [facility] [place]    Patient Care Team:  SAMEERA Olivia as PCP - General (Family Medicine)

## 2024-03-18 NOTE — ASSESSMENT & PLAN NOTE
-Will trial off of Wellbutrin, start Lexapro, discussed tapering  -Follow-up in 1 month or sooner if needed

## 2024-04-05 ENCOUNTER — HOSPITAL ENCOUNTER (OUTPATIENT)
Dept: RADIOLOGY | Facility: HOSPITAL | Age: 34
Discharge: HOME | End: 2024-04-05
Payer: COMMERCIAL

## 2024-04-05 DIAGNOSIS — E04.9 ENLARGED THYROID: ICD-10-CM

## 2024-04-05 DIAGNOSIS — F90.9 ATTENTION DEFICIT HYPERACTIVITY DISORDER (ADHD), UNSPECIFIED ADHD TYPE: ICD-10-CM

## 2024-04-05 PROCEDURE — 76536 US EXAM OF HEAD AND NECK: CPT

## 2024-04-05 RX ORDER — DEXTROAMPHETAMINE SACCHARATE, AMPHETAMINE ASPARTATE MONOHYDRATE, DEXTROAMPHETAMINE SULFATE AND AMPHETAMINE SULFATE 2.5; 2.5; 2.5; 2.5 MG/1; MG/1; MG/1; MG/1
10 CAPSULE, EXTENDED RELEASE ORAL EVERY MORNING
Qty: 30 CAPSULE | Refills: 0 | Status: SHIPPED | OUTPATIENT
Start: 2024-04-05

## 2024-04-22 ENCOUNTER — TELEPHONE (OUTPATIENT)
Dept: OTHER | Facility: OTHER | Age: 34
End: 2024-04-22

## 2024-04-22 NOTE — TELEPHONE ENCOUNTER
Patient called and canceled his appointment schedule for 4/23 at 7:40 am and reschedule his appointment for 5/3 at 7:40 am.

## 2024-05-09 DIAGNOSIS — F90.9 ATTENTION DEFICIT HYPERACTIVITY DISORDER (ADHD), UNSPECIFIED ADHD TYPE: ICD-10-CM

## 2024-05-10 RX ORDER — DEXTROAMPHETAMINE SACCHARATE, AMPHETAMINE ASPARTATE MONOHYDRATE, DEXTROAMPHETAMINE SULFATE AND AMPHETAMINE SULFATE 2.5; 2.5; 2.5; 2.5 MG/1; MG/1; MG/1; MG/1
10 CAPSULE, EXTENDED RELEASE ORAL EVERY MORNING
Qty: 30 CAPSULE | Refills: 0 | Status: SHIPPED | OUTPATIENT
Start: 2024-05-10

## 2024-06-10 DIAGNOSIS — F90.9 ATTENTION DEFICIT HYPERACTIVITY DISORDER (ADHD), UNSPECIFIED ADHD TYPE: ICD-10-CM

## 2024-06-11 RX ORDER — DEXTROAMPHETAMINE SACCHARATE, AMPHETAMINE ASPARTATE MONOHYDRATE, DEXTROAMPHETAMINE SULFATE AND AMPHETAMINE SULFATE 2.5; 2.5; 2.5; 2.5 MG/1; MG/1; MG/1; MG/1
10 CAPSULE, EXTENDED RELEASE ORAL EVERY MORNING
Qty: 30 CAPSULE | Refills: 0 | Status: SHIPPED | OUTPATIENT
Start: 2024-06-11

## 2024-07-03 ENCOUNTER — APPOINTMENT (OUTPATIENT)
Dept: LAB | Facility: CLINIC | Age: 34
End: 2024-07-03
Payer: COMMERCIAL

## 2024-07-03 DIAGNOSIS — R63.4 WEIGHT LOSS: ICD-10-CM

## 2024-07-03 DIAGNOSIS — Z13.228 SCREENING FOR METABOLIC DISORDER: ICD-10-CM

## 2024-07-03 LAB
25(OH)D3 SERPL-MCNC: 19.7 NG/ML (ref 30–100)
ALBUMIN SERPL BCG-MCNC: 4.7 G/DL (ref 3.5–5)
ALP SERPL-CCNC: 53 U/L (ref 34–104)
ALT SERPL W P-5'-P-CCNC: 13 U/L (ref 7–52)
ANION GAP SERPL CALCULATED.3IONS-SCNC: 12 MMOL/L (ref 4–13)
AST SERPL W P-5'-P-CCNC: 15 U/L (ref 13–39)
BASOPHILS # BLD AUTO: 0.05 THOUSANDS/ÂΜL (ref 0–0.1)
BASOPHILS NFR BLD AUTO: 1 % (ref 0–1)
BILIRUB SERPL-MCNC: 0.42 MG/DL (ref 0.2–1)
BUN SERPL-MCNC: 12 MG/DL (ref 5–25)
CALCIUM SERPL-MCNC: 9.7 MG/DL (ref 8.4–10.2)
CHLORIDE SERPL-SCNC: 103 MMOL/L (ref 96–108)
CHOLEST SERPL-MCNC: 160 MG/DL
CO2 SERPL-SCNC: 26 MMOL/L (ref 21–32)
CREAT SERPL-MCNC: 1.02 MG/DL (ref 0.6–1.3)
EOSINOPHIL # BLD AUTO: 0.13 THOUSAND/ÂΜL (ref 0–0.61)
EOSINOPHIL NFR BLD AUTO: 3 % (ref 0–6)
ERYTHROCYTE [DISTWIDTH] IN BLOOD BY AUTOMATED COUNT: 11.7 % (ref 11.6–15.1)
GFR SERPL CREATININE-BSD FRML MDRD: 97 ML/MIN/1.73SQ M
GLUCOSE P FAST SERPL-MCNC: 85 MG/DL (ref 65–99)
HCT VFR BLD AUTO: 42.9 % (ref 36.5–49.3)
HDLC SERPL-MCNC: 47 MG/DL
HGB BLD-MCNC: 14.3 G/DL (ref 12–17)
IMM GRANULOCYTES # BLD AUTO: 0.01 THOUSAND/UL (ref 0–0.2)
IMM GRANULOCYTES NFR BLD AUTO: 0 % (ref 0–2)
LDLC SERPL CALC-MCNC: 97 MG/DL (ref 0–100)
LYMPHOCYTES # BLD AUTO: 2.2 THOUSANDS/ÂΜL (ref 0.6–4.47)
LYMPHOCYTES NFR BLD AUTO: 44 % (ref 14–44)
MCH RBC QN AUTO: 30.4 PG (ref 26.8–34.3)
MCHC RBC AUTO-ENTMCNC: 33.3 G/DL (ref 31.4–37.4)
MCV RBC AUTO: 91 FL (ref 82–98)
MONOCYTES # BLD AUTO: 0.31 THOUSAND/ÂΜL (ref 0.17–1.22)
MONOCYTES NFR BLD AUTO: 6 % (ref 4–12)
NEUTROPHILS # BLD AUTO: 2.36 THOUSANDS/ÂΜL (ref 1.85–7.62)
NEUTS SEG NFR BLD AUTO: 46 % (ref 43–75)
NONHDLC SERPL-MCNC: 113 MG/DL
NRBC BLD AUTO-RTO: 0 /100 WBCS
PLATELET # BLD AUTO: 373 THOUSANDS/UL (ref 149–390)
PMV BLD AUTO: 10.1 FL (ref 8.9–12.7)
POTASSIUM SERPL-SCNC: 4.6 MMOL/L (ref 3.5–5.3)
PROT SERPL-MCNC: 7.2 G/DL (ref 6.4–8.4)
RBC # BLD AUTO: 4.71 MILLION/UL (ref 3.88–5.62)
SODIUM SERPL-SCNC: 141 MMOL/L (ref 135–147)
TRIGL SERPL-MCNC: 80 MG/DL
TSH SERPL DL<=0.05 MIU/L-ACNC: 0.8 UIU/ML (ref 0.45–4.5)
VIT B12 SERPL-MCNC: 305 PG/ML (ref 180–914)
WBC # BLD AUTO: 5.06 THOUSAND/UL (ref 4.31–10.16)

## 2024-07-03 PROCEDURE — 82607 VITAMIN B-12: CPT

## 2024-07-03 PROCEDURE — 80053 COMPREHEN METABOLIC PANEL: CPT

## 2024-07-10 ENCOUNTER — TELEPHONE (OUTPATIENT)
Dept: INTERNAL MEDICINE CLINIC | Facility: OTHER | Age: 34
End: 2024-07-10

## 2024-07-16 DIAGNOSIS — F90.9 ATTENTION DEFICIT HYPERACTIVITY DISORDER (ADHD), UNSPECIFIED ADHD TYPE: ICD-10-CM

## 2024-07-17 RX ORDER — DEXTROAMPHETAMINE SACCHARATE, AMPHETAMINE ASPARTATE MONOHYDRATE, DEXTROAMPHETAMINE SULFATE AND AMPHETAMINE SULFATE 2.5; 2.5; 2.5; 2.5 MG/1; MG/1; MG/1; MG/1
10 CAPSULE, EXTENDED RELEASE ORAL EVERY MORNING
Qty: 30 CAPSULE | Refills: 0 | Status: SHIPPED | OUTPATIENT
Start: 2024-07-17

## 2024-08-03 ENCOUNTER — APPOINTMENT (EMERGENCY)
Dept: RADIOLOGY | Facility: HOSPITAL | Age: 34
End: 2024-08-03
Payer: COMMERCIAL

## 2024-08-03 ENCOUNTER — HOSPITAL ENCOUNTER (EMERGENCY)
Facility: HOSPITAL | Age: 34
Discharge: HOME | End: 2024-08-03
Attending: EMERGENCY MEDICINE | Admitting: EMERGENCY MEDICINE
Payer: COMMERCIAL

## 2024-08-03 VITALS
OXYGEN SATURATION: 100 % | SYSTOLIC BLOOD PRESSURE: 126 MMHG | DIASTOLIC BLOOD PRESSURE: 79 MMHG | HEART RATE: 69 BPM | TEMPERATURE: 98 F | RESPIRATION RATE: 16 BRPM

## 2024-08-03 DIAGNOSIS — R20.2 PARESTHESIAS: Primary | ICD-10-CM

## 2024-08-03 PROCEDURE — 70450 CT HEAD/BRAIN W/O DYE: CPT

## 2024-08-03 PROCEDURE — 99284 EMERGENCY DEPT VISIT MOD MDM: CPT | Performed by: EMERGENCY MEDICINE

## 2024-08-03 PROCEDURE — 99284 EMERGENCY DEPT VISIT MOD MDM: CPT

## 2024-08-03 NOTE — ED ATTENDING ATTESTATION
8/3/2024  I, Lenore Liz MD, saw and evaluated the patient. I have discussed the patient with the resident/non-physician practitioner and agree with the resident's/non-physician practitioner's findings, Plan of Care, and MDM as documented in the resident's/non-physician practitioner's note, except where noted. All available labs and Radiology studies were reviewed.  I was present for key portions of any procedure(s) performed by the resident/non-physician practitioner and I was immediately available to provide assistance.       At this point I agree with the current assessment done in the Emergency Department.  I have conducted an independent evaluation of this patient a history and physical is as follows:    ED Course         Critical Care Time  Procedures    33 yo male with bilateral upper and lower extremity numbness for one year. Pt states over the last one year feeling clumsy. Pt has been falling, most recently one week ago.  Pt with hx of depression and taking meds.  Pt told pcp these symptoms and labs with no acute findings.  Pt not on anxiety meds.vss, afebrile, lungs cta, rrr, abdomen soft nontender, anxious appearing.  Ct head.

## 2024-08-03 NOTE — ED PROVIDER NOTES
"History  Chief Complaint   Patient presents with    Anxiety     Pt fell last Saturday, pt unable to recall why he fell. Sunday developed fever and chills which went away Wednesday. Prior to fall pt states he was feeling \"unwell\", body movements \"felt\" uncoordinated, and just \"off\" which has been ongoing since events. Pt reports hx of anxiousness but doesn't take anxiety meds. +HS -thinners     Numbness     Pt having periodic b/l hand and feet numbness for the past week      34-year-old male with past medical history anxiety, ADHD, presents ED for evaluation of feeling \"off \".  He states that this has been going on for several years.  He feels like he is more clumsy than usual.  He also reports that over the past week he has had intermittent numbness and tingling in his fingertips.  He states that approximately 1 week ago he had a fall with head strike.  He is unsure why he fell.  He is not on anticoagulation or antiplatelet medications.  He currently does not complain of headache, chest pain, shortness of breath, abdominal pain, focal weakness.          Prior to Admission Medications   Prescriptions Last Dose Informant Patient Reported? Taking?   amphetamine-dextroamphetamine (ADDERALL XR, 10MG,) 10 MG 24 hr capsule   No No   Sig: Take 1 capsule (10 mg total) by mouth every morning Max Daily Amount: 10 mg   benzonatate (TESSALON) 200 MG capsule  Self Yes No   escitalopram (LEXAPRO) 10 mg tablet   No No   Sig: Take 1 tablet (10 mg total) by mouth daily   hydrOXYzine HCL (ATARAX) 25 mg tablet  Self No No   Sig: Take 1 tablet (25 mg total) by mouth every 6 (six) hours as needed for anxiety      Facility-Administered Medications: None       History reviewed. No pertinent past medical history.    Past Surgical History:   Procedure Laterality Date    NO PAST SURGERIES         Family History   Problem Relation Age of Onset    Stroke Mother     No Known Problems Father     No Known Problems Sister      I have reviewed and " agree with the history as documented.    E-Cigarette/Vaping    E-Cigarette Use Former User     Start Date 1/1/21     Quit Date 12/1/22      E-Cigarette/Vaping Substances    Nicotine Yes     THC No     CBD No     Flavoring No     Other No     Unknown No      Social History     Tobacco Use    Smoking status: Former     Current packs/day: 0.00     Types: Cigarettes     Start date: 2017     Quit date: 1/21/2021     Years since quitting: 3.5    Smokeless tobacco: Never   Vaping Use    Vaping status: Former    Start date: 1/1/2021    Quit date: 12/1/2022    Substances: Nicotine   Substance Use Topics    Alcohol use: Yes    Drug use: Yes     Types: Marijuana        Review of Systems   Respiratory:  Negative for shortness of breath.    Cardiovascular:  Negative for chest pain.   Gastrointestinal:  Negative for nausea and vomiting.   Musculoskeletal:  Negative for back pain and neck pain.   Neurological:  Negative for dizziness, weakness, light-headedness, numbness and headaches.   Psychiatric/Behavioral:  Negative for behavioral problems and confusion.        Physical Exam  ED Triage Vitals   Temperature Pulse Respirations Blood Pressure SpO2   08/03/24 1315 08/03/24 1315 08/03/24 1315 08/03/24 1315 08/03/24 1315   98 °F (36.7 °C) 82 16 140/98 100 %      Temp Source Heart Rate Source Patient Position - Orthostatic VS BP Location FiO2 (%)   08/03/24 1315 08/03/24 1315 08/03/24 1549 08/03/24 1315 --   Temporal Monitor Sitting Left arm       Pain Score       08/03/24 1315       No Pain             Orthostatic Vital Signs  Vitals:    08/03/24 1315 08/03/24 1549   BP: 140/98 126/79   Pulse: 82 69   Patient Position - Orthostatic VS:  Sitting       Physical Exam  Vitals and nursing note reviewed.   Constitutional:       General: He is not in acute distress.     Appearance: Normal appearance. He is normal weight. He is not ill-appearing, toxic-appearing or diaphoretic.   HENT:      Head: Normocephalic and atraumatic.      Nose: No  "rhinorrhea.      Mouth/Throat:      Mouth: Mucous membranes are moist.      Pharynx: Oropharynx is clear.   Eyes:      Conjunctiva/sclera: Conjunctivae normal.   Cardiovascular:      Rate and Rhythm: Normal rate and regular rhythm.      Pulses: Normal pulses.      Heart sounds: Normal heart sounds.   Pulmonary:      Effort: Pulmonary effort is normal.      Breath sounds: Normal breath sounds.   Abdominal:      General: Abdomen is flat.      Palpations: Abdomen is soft.      Tenderness: There is no abdominal tenderness.   Musculoskeletal:      Cervical back: Neck supple.   Skin:     General: Skin is warm and dry.      Capillary Refill: Capillary refill takes less than 2 seconds.   Neurological:      General: No focal deficit present.      Mental Status: He is alert and oriented to person, place, and time.         ED Medications  Medications - No data to display    Diagnostic Studies  Results Reviewed       None                   CT head without contrast   Final Result by Roe Lipscomb DO (08/03 5468)      No acute intracranial abnormality.                  Workstation performed: [id number]               Procedures  Procedures      ED Course  ED Course as of 08/03/24 1611   Sat Aug 03, 2024   1605 CT head without contrast  No acute intracranial abnormality.                                       Medical Decision Making  34-year-old male presents to ED for intermittent numbness and tingling of the bilateral fingers x 1 week, as well as \"feeling off \"for several years.  Of note, patient had recent blood work approximately 1 month ago including CBC, BMP, TSH.  Vitamin D was noted to be low.  Upon chart review, I do note that the patient had an MRI brain ordered in the past which he did not complete.  On exam, vitals unremarkable.  Patient in no acute distress.  Heart and lungs clear.  Patient is neurologically intact.  No focal neurologic deficit.  Differential diagnosis: With patient's recent blood work, I doubt " electrolyte abnormality, anemia, thyroid, vitamin B12 deficiency.  Cannot rule out intracranial abnormality.   Plan: Will order CT head to rule out intracranial abnormality.  If negative, will discharge the patient home with outpatient follow-up with PCP.    Amount and/or Complexity of Data Reviewed  Radiology: ordered. Decision-making details documented in ED Course.          Disposition  Final diagnoses:   Paresthesias     Time reflects when diagnosis was documented in both MDM as applicable and the Disposition within this note       Time User Action Codes Description Comment    8/3/2024  4:05 PM Steven Maloney Add [R20.2] Paresthesias           ED Disposition       ED Disposition   Discharge    Condition   Stable    Date/Time   Sat Aug 3, 2024  4:05 PM    Comment   Jaylen Angel discharge to home/self care.                   Follow-up Information       Follow up With Specialties Details Why Contact Info Additional Information    [facility]   [address] [facility] [address]  [phone] [facility] [address] [place], [place], [address]   [phone]            Patient's Medications   Discharge Prescriptions    No medications on file     No discharge procedures on file.    PDMP Review       None             ED Provider  Attending physically available and evaluated Jaylen Angel. I managed the patient along with the ED Attending.    Electronically Signed by           Steven Maloney DO  08/03/24 0732

## 2024-08-03 NOTE — DISCHARGE INSTRUCTIONS
Your CAT scan of the brain in the emergency department was negative for intracranial abnormality.  We are providing you a referral for neurology.  Return to the emergency department if your symptoms worsen.

## 2024-08-05 ENCOUNTER — TELEPHONE (OUTPATIENT)
Dept: INTERNAL MEDICINE CLINIC | Facility: OTHER | Age: 34
End: 2024-08-05

## 2024-08-17 DIAGNOSIS — F90.9 ATTENTION DEFICIT HYPERACTIVITY DISORDER (ADHD), UNSPECIFIED ADHD TYPE: ICD-10-CM

## 2024-08-21 RX ORDER — DEXTROAMPHETAMINE SACCHARATE, AMPHETAMINE ASPARTATE MONOHYDRATE, DEXTROAMPHETAMINE SULFATE AND AMPHETAMINE SULFATE 2.5; 2.5; 2.5; 2.5 MG/1; MG/1; MG/1; MG/1
10 CAPSULE, EXTENDED RELEASE ORAL EVERY MORNING
Qty: 30 CAPSULE | Refills: 0 | Status: SHIPPED | OUTPATIENT
Start: 2024-08-21

## 2024-08-29 ENCOUNTER — TELEPHONE (OUTPATIENT)
Age: 34
End: 2024-08-29

## 2024-08-29 NOTE — TELEPHONE ENCOUNTER
Left patient detailed message  on patient machine.Asked he call the office to make appointment for September.

## 2024-08-29 NOTE — TELEPHONE ENCOUNTER
In order to maintain refills of Adderall, as stated on controlled substance agreement he will need to be seen every 6 months, it appears that he scheduled a yearly appointment please get him in to be seen within 6 months of his last office visit.

## 2024-11-12 ENCOUNTER — OFFICE VISIT (OUTPATIENT)
Age: 34
End: 2024-11-12
Payer: COMMERCIAL

## 2024-11-12 VITALS
TEMPERATURE: 98.5 F | OXYGEN SATURATION: 98 % | WEIGHT: 159.6 LBS | HEART RATE: 68 BPM | SYSTOLIC BLOOD PRESSURE: 118 MMHG | DIASTOLIC BLOOD PRESSURE: 78 MMHG | BODY MASS INDEX: 22.34 KG/M2 | HEIGHT: 71 IN

## 2024-11-12 DIAGNOSIS — Z23 ENCOUNTER FOR IMMUNIZATION: ICD-10-CM

## 2024-11-12 DIAGNOSIS — E78.2 MIXED HYPERLIPIDEMIA: ICD-10-CM

## 2024-11-12 DIAGNOSIS — R27.8 UNCOORDINATED MOVEMENTS: ICD-10-CM

## 2024-11-12 DIAGNOSIS — F32.A DEPRESSION, UNSPECIFIED DEPRESSION TYPE: ICD-10-CM

## 2024-11-12 DIAGNOSIS — R35.0 URINARY FREQUENCY: ICD-10-CM

## 2024-11-12 DIAGNOSIS — F51.01 PRIMARY INSOMNIA: ICD-10-CM

## 2024-11-12 DIAGNOSIS — F90.9 ATTENTION DEFICIT HYPERACTIVITY DISORDER (ADHD), UNSPECIFIED ADHD TYPE: Primary | ICD-10-CM

## 2024-11-12 LAB
BACTERIA UR QL AUTO: ABNORMAL /HPF
BILIRUB UR QL STRIP: NEGATIVE
CLARITY UR: CLEAR
COLOR UR: YELLOW
GLUCOSE UR STRIP-MCNC: NEGATIVE MG/DL
HGB UR QL STRIP.AUTO: NEGATIVE
KETONES UR STRIP-MCNC: NEGATIVE MG/DL
LEUKOCYTE ESTERASE UR QL STRIP: ABNORMAL
MUCOUS THREADS UR QL AUTO: ABNORMAL
NITRITE UR QL STRIP: NEGATIVE
NON-SQ EPI CELLS URNS QL MICRO: ABNORMAL /HPF
PH UR STRIP.AUTO: 6 [PH]
PROT UR STRIP-MCNC: ABNORMAL MG/DL
RBC #/AREA URNS AUTO: ABNORMAL /HPF
SP GR UR STRIP.AUTO: 1.03 (ref 1–1.03)
UROBILINOGEN UR STRIP-ACNC: <2 MG/DL
WBC #/AREA URNS AUTO: ABNORMAL /HPF

## 2024-11-12 PROCEDURE — 90471 IMMUNIZATION ADMIN: CPT

## 2024-11-12 PROCEDURE — 90656 IIV3 VACC NO PRSV 0.5 ML IM: CPT

## 2024-11-12 PROCEDURE — 81001 URINALYSIS AUTO W/SCOPE: CPT | Performed by: NURSE PRACTITIONER

## 2024-11-12 PROCEDURE — 99215 OFFICE O/P EST HI 40 MIN: CPT | Performed by: NURSE PRACTITIONER

## 2024-11-12 RX ORDER — DEXTROAMPHETAMINE SACCHARATE, AMPHETAMINE ASPARTATE MONOHYDRATE, DEXTROAMPHETAMINE SULFATE AND AMPHETAMINE SULFATE 2.5; 2.5; 2.5; 2.5 MG/1; MG/1; MG/1; MG/1
10 CAPSULE, EXTENDED RELEASE ORAL EVERY MORNING
Qty: 30 CAPSULE | Refills: 0 | Status: SHIPPED | OUTPATIENT
Start: 2024-11-12

## 2024-11-12 NOTE — PROGRESS NOTES
Ambulatory Visit  Name: Jaylen Angel      : 1990      MRN: [id number]  Encounter Provider: SAMEERA Olivia  Encounter Date: 2024   Encounter department: [facility] [place]    Assessment & Plan  Attention deficit hyperactivity disorder (ADHD), unspecified ADHD type  Controlled on Adderall to 10 mg tablet extended release  Follow-up in 6 months or sooner if needed  Controlled substance agreement on file      Consider trial off of this medication if patient continues to lose weight and has decreased appetite  Orders:    amphetamine-dextroamphetamine (ADDERALL XR, 10MG,) 10 MG 24 hr capsule; Take 1 capsule (10 mg total) by mouth every morning Max Daily Amount: 10 mg    Encounter for immunization    Orders:    influenza vaccine preservative-free 0.5 mL IM (Fluzone, Afluria, Fluarix, Flulaval)    Uncoordinated movements  Reviewed ED note, imaging and labs  Referral to neurology   Orders:    Ambulatory Referral to Neurology; Future    Urinary frequency    Orders:    UA w Reflex to Microscopic w Reflex to Culture; Future    PSA, Total Screen; Future    Lyme Total AB W Reflex to IGM/IGG; Future    UA w Reflex to Microscopic w Reflex to Culture    Depression, unspecified depression type  Depression Screening Follow-up Plan: Patient's depression screening was positive with a PHQ-9 score of 11. Patient assessed for underlying major depression. They have no active suicidal ideations. Brief counseling provided and recommend additional follow-up/re-evaluation next office visit.  -controlled at this time on Lexapro   -continue to follow up with therapist          Primary insomnia  Melatonin at night         Mixed hyperlipidemia  Recommended starting fish oil. Recommend healthy lifestyle choices for your cholesterol.  Low fat/low cholesterol diet.  Limit/avoid red meat.  Eat more lean meat - chicken breast, ground turkey, fish.  Exercise 30 mins at least 5 times a week as  tolerated.               Depression Screening and Follow-up Plan: Patient's depression screening was positive with a PHQ-9 score of 11. Patient with underlying depression and was advised to continue current medications as prescribed.       History of Present Illness     Patient presents today to follow up on anxiety/depression, ADHD and for annual physical.    Former smoker- recently quit  Social alcohol abuse.     Anxiety/depression-was started on Lexapro at last office visit   Note from last office visit:He reports that he is having trouble focusing at work, he reports that he has trouble finishing tasks a times. He does reports some depression and the feelings of be overwhelemed at times due to his moms health.  This tends to effect his sleep and his attendance at work.    Has lost about 30lbs since last office visit, he reports decreased appetite since about August, he reports that he has been more depressed lately    Last office visit we increased his Wellbutrin to 300 mg and in regards to depression and anxiety he feels uncontrolled, we had also started him on Adderall 10 mg tablet daily, he reports appetite decreased and insomnia  Reports overall since starting Adderall he is notices significant improvement     He reports a sharp pain at the bottom left of his head, he reports that it comes and goes for the last month, denies injury  He reports more fatigued lately, lightheaded with exertion, for about the past two months   He reports epigastric discomfort with fatigue, denies crushing chest pain, he does report some SOB      Feel uncoordinated and off balance at times, has been going on for the past year-was seen in the ER for this concern   CT brain-IMPRESSION:     No acute intracranial abnormality.  Denies auto immune disorder in his family     He reports urinary urgency, happens daily, for about a year   Denies new sexual partners, no concern for STD               History obtained from : patient  Review  "of Systems   Constitutional:  Negative for activity change, appetite change, chills, diaphoresis and fever.   HENT:  Negative for congestion, ear discharge, ear pain, postnasal drip, rhinorrhea, sinus pressure, sinus pain and sore throat.    Eyes:  Negative for pain, discharge, itching and visual disturbance.   Respiratory:  Negative for cough, chest tightness, shortness of breath and wheezing.    Cardiovascular:  Negative for chest pain, palpitations and leg swelling.   Gastrointestinal:  Negative for abdominal pain, constipation, diarrhea, nausea and vomiting.   Endocrine: Negative for polydipsia, polyphagia and polyuria.   Genitourinary:  Positive for urgency. Negative for difficulty urinating, dysuria and frequency.   Musculoskeletal:  Negative for arthralgias, back pain and neck pain.   Skin:  Negative for rash and wound.   Neurological:  Negative for dizziness, weakness, numbness and headaches.   Psychiatric/Behavioral:  Positive for dysphoric mood. The patient is nervous/anxious.            Objective     /78 (BP Location: Left arm, Patient Position: Sitting, Cuff Size: Standard)   Pulse 68   Temp 98.5 °F (36.9 °C) (Temporal)   Ht 5' 10.59\" (1.793 m)   Wt 72.4 kg (159 lb 9.6 oz)   SpO2 98%   BMI 22.52 kg/m²     Physical Exam  Constitutional:       General: He is not in acute distress.     Appearance: He is well-developed. He is not diaphoretic.   HENT:      Head: Normocephalic and atraumatic.      Right Ear: External ear normal.      Left Ear: External ear normal.      Nose: Nose normal.      Mouth/Throat:      Mouth: Mucous membranes are moist.      Pharynx: No oropharyngeal exudate or posterior oropharyngeal erythema.   Eyes:      General:         Right eye: No discharge.         Left eye: No discharge.      Conjunctiva/sclera: Conjunctivae normal.      Pupils: Pupils are equal, round, and reactive to light.   Neck:      Thyroid: No thyromegaly.   Cardiovascular:      Rate and Rhythm: Normal rate " and regular rhythm.      Heart sounds: Normal heart sounds. No murmur heard.     No friction rub. No gallop.   Pulmonary:      Effort: Pulmonary effort is normal. No respiratory distress.      Breath sounds: Normal breath sounds. No stridor. No wheezing or rales.   Abdominal:      General: Bowel sounds are normal. There is no distension.      Palpations: Abdomen is soft.      Tenderness: There is no abdominal tenderness.   Musculoskeletal:      Cervical back: Normal range of motion and neck supple.   Lymphadenopathy:      Cervical: No cervical adenopathy.   Skin:     General: Skin is warm and dry.      Findings: No erythema or rash.   Neurological:      Mental Status: He is alert and oriented to person, place, and time.   Psychiatric:         Behavior: Behavior normal.         Thought Content: Thought content normal.         Judgment: Judgment normal.

## 2024-11-12 NOTE — ASSESSMENT & PLAN NOTE
Depression Screening Follow-up Plan: Patient's depression screening was positive with a PHQ-9 score of 11. Patient assessed for underlying major depression. They have no active suicidal ideations. Brief counseling provided and recommend additional follow-up/re-evaluation next office visit.  -controlled at this time on Lexapro   -continue to follow up with therapist

## 2024-11-12 NOTE — ASSESSMENT & PLAN NOTE
Reviewed ED note, imaging and labs  Referral to neurology   Orders:    Ambulatory Referral to Neurology; Future

## 2024-11-12 NOTE — ASSESSMENT & PLAN NOTE
Controlled on Adderall to 10 mg tablet extended release  Follow-up in 6 months or sooner if needed  Controlled substance agreement on file      Consider trial off of this medication if patient continues to lose weight and has decreased appetite  Orders:    amphetamine-dextroamphetamine (ADDERALL XR, 10MG,) 10 MG 24 hr capsule; Take 1 capsule (10 mg total) by mouth every morning Max Daily Amount: 10 mg

## 2024-11-13 ENCOUNTER — RESULTS FOLLOW-UP (OUTPATIENT)
Age: 34
End: 2024-11-13

## 2024-12-27 DIAGNOSIS — F90.9 ATTENTION DEFICIT HYPERACTIVITY DISORDER (ADHD), UNSPECIFIED ADHD TYPE: ICD-10-CM

## 2024-12-27 DIAGNOSIS — F32.A DEPRESSION, UNSPECIFIED DEPRESSION TYPE: ICD-10-CM

## 2024-12-27 RX ORDER — ESCITALOPRAM OXALATE 10 MG/1
10 TABLET ORAL DAILY
Qty: 90 TABLET | Refills: 1 | Status: SHIPPED | OUTPATIENT
Start: 2024-12-27 | End: 2025-01-09

## 2024-12-30 RX ORDER — DEXTROAMPHETAMINE SACCHARATE, AMPHETAMINE ASPARTATE MONOHYDRATE, DEXTROAMPHETAMINE SULFATE AND AMPHETAMINE SULFATE 2.5; 2.5; 2.5; 2.5 MG/1; MG/1; MG/1; MG/1
10 CAPSULE, EXTENDED RELEASE ORAL EVERY MORNING
Qty: 30 CAPSULE | Refills: 0 | Status: SHIPPED | OUTPATIENT
Start: 2024-12-30 | End: 2025-01-09

## 2024-12-31 NOTE — PROGRESS NOTES
1/3/2025    Chief Complaint   Patient presents with   • Vasectomy     consultation     Assessment and Plan    34 y.o. male managed by new patient    1. Desire for elective sterilization  - exam today as below  - informed consent signed today  - continue/ensure continued contraception until sterilization confirmed below  - rx xanax 1mg with  to/from on appt date  - shave all penile/scrotal/pubic hair day prior to appt date  - need for post-vasectomy semen analysis at 8 weeks after procedure to confirm sterility  -chlorhexidine wash the night prior and the morning of, can get over the counter     Return for vasectomy in office.  Consent reviewed and signed   Reviewed risks     History of Present Illness  Jaylen Angel is a 34 y.o. male here for evaluation of VASECTOMY CONSULT    History of genitourinary or groin trauma or surgery-0  Fathered children- 0- aware that this is considered a permanent form of sterilization, there are reversal procedures but they are expensive and not 100% guaranteed.  Patient aware and would like to proceed as him and his wife have tried for a year and have decided that they do not want to have children  Personal and/or mutual desire for permanent sterility-yes-- Do not want children   Current contraceptive method-IUD- now removed- didn't want to be on long term birthcontrol   Work/manual labor/lifting-desk work   Voiding issues- none  Bleeding issues/thinners- none  Allergies to lidocaine/marcaine/betadine/chromic- none    The patient presents requesting elective sterilization vasectomy.     We discussed that vasectomy is in operation performed in the office in order to provide elective sterilization. There are instances in which body habitus or changes to the genital tissue/anatomy would necessitate procedure done in a surgical suite/hospital operating room. Physical exam is performed today to assess the candidate specifically for this reason.    This procedure should be  considered a permanent option. Although there are subspecialists who perform vasectomy reversals, these operations are not 100% successful and are often not covered by insurance meaning they can come with a large out-of-pocket cost. The patient understands this.     We reviewed the procedure in depth. Risk and benefits of the procedure were discussed and reviewed. Informed consent was obtained in the office today. The patient was prescribed a benzodiazepine to take one hour prior to the procedure to assist with his comfort.  He understands that he will require transportation by a sober  to and from the office that day if he is to use the benzodiazepine.       He also understands he will require semen analysis testing at 8 weeks post procedure to ensure full sterilization.  In the interim, he will require contraception during intercourse to avoid an undesired pregnancy.     Usually, patients are out of work for 2-3 days. We recommend tight fitting scrotal support following the procedure along with ice packs applied to the scrotum 15 minutes on and 15 minutes off for the first 24-48 hours. We discussed that we do send the patient home with short course of anti-inflammatory and/or narcotic pain medication.     After this discussion, the patient agrees to proceed. We will schedule him in the near future.    He agrees to take oral sedative - xanax 1mg one hour prior to procedure.    Review of Systems   Constitutional:  Negative for chills and fever.   HENT:  Negative for ear pain and sore throat.    Eyes:  Negative for pain and visual disturbance.   Respiratory:  Negative for cough and shortness of breath.    Cardiovascular:  Negative for chest pain and palpitations.   Gastrointestinal:  Negative for abdominal pain and vomiting.   Genitourinary:  Negative for decreased urine volume, difficulty urinating, dysuria, flank pain, frequency, hematuria, testicular pain and urgency.   Musculoskeletal:  Negative for  "arthralgias and back pain.   Skin:  Negative for color change and rash.   Neurological:  Negative for seizures and syncope.   All other systems reviewed and are negative.    Vitals  Vitals:    01/03/25 0907   BP: 120/80   BP Location: Left arm   Patient Position: Sitting   Cuff Size: Adult   Pulse: 71   SpO2: 98%   Weight: 68.9 kg (152 lb)   Height: 5' 10.5\" (1.791 m)       Physical Exam  Vitals reviewed.   Constitutional:       Appearance: Normal appearance.   HENT:      Head: Normocephalic and atraumatic.   Eyes:      Conjunctiva/sclera: Conjunctivae normal.   Pulmonary:      Effort: Pulmonary effort is normal.   Abdominal:      General: Abdomen is flat. There is no distension.      Palpations: Abdomen is soft.      Tenderness: There is no abdominal tenderness.   Genitourinary:     Penis: Normal.       Testes: Normal.      Comments: Vas deferens palpated bilaterally, both more posteriorly located  Musculoskeletal:         General: Normal range of motion.      Cervical back: Normal range of motion.   Skin:     General: Skin is warm and dry.   Neurological:      General: No focal deficit present.      Mental Status: He is alert and oriented to person, place, and time.   Psychiatric:         Mood and Affect: Mood normal.         Behavior: Behavior normal.         Thought Content: Thought content normal.         Judgment: Judgment normal.         General: Well appearing, no distress, appears stated age.  HEENT:  Normocephalic, atraumatic. Conjunctiva clear.  Respiratory: Nonlabored respirations, no wheeze or cough  Abdomen:  Soft nontender without hernia. No suprapubic or CVA tenderness.  Genitourinary: Circumcised penis, normal phallus, orthotopic patent meatus.  Testes smooth descended bilaterally into the scrotum nontender with no palpable mass.  Palpably normal spermatic cord and vas deferens bilaterally.  Musculoskeletal:  Normal range of motion and gait without defecit.  Neuro: No gross neurologic defect or " "abnormality. Steady unassisted gait. Speech and affect normal.  Dermatologic: skin warm, dry; no rash erythema or ecchymosis      Past History  History reviewed. No pertinent past medical history.  Social History     Socioeconomic History   • Marital status: /Civil Union     Spouse name: None   • Number of children: None   • Years of education: None   • Highest education level: None   Occupational History   • None   Tobacco Use   • Smoking status: Former     Current packs/day: 0.00     Types: Cigarettes     Start date: 2017     Quit date: 1/21/2021     Years since quitting: 3.9   • Smokeless tobacco: Never   Vaping Use   • Vaping status: Former   • Start date: 1/1/2021   • Quit date: 12/1/2022   • Substances: Nicotine   Substance and Sexual Activity   • Alcohol use: Yes   • Drug use: Yes     Types: Marijuana   • Sexual activity: None   Other Topics Concern   • None   Social History Narrative   • None     Social Drivers of Health     Financial Resource Strain: Not on file   Food Insecurity: Not on file   Transportation Needs: Not on file   Physical Activity: Not on file   Stress: Not on file   Social Connections: Not on file   Intimate Partner Violence: Not on file   Housing Stability: Not on file     Social History     Tobacco Use   Smoking Status Former   • Current packs/day: 0.00   • Types: Cigarettes   • Start date: 2017   • Quit date: 1/21/2021   • Years since quitting: 3.9   Smokeless Tobacco Never     Family History   Problem Relation Age of Onset   • Stroke Mother    • No Known Problems Father    • No Known Problems Sister        The following portions of the patient's history were reviewed and updated as appropriate: allergies, current medications, past medical history, past social history, past surgical history and problem list.    Results  No results found for this or any previous visit (from the past hour).]  No results found for: \"PSA\"  Lab Results   Component Value Date    CALCIUM 9.7 07/03/2024 "    K 4.6 07/03/2024    CO2 26 07/03/2024     07/03/2024    BUN 12 07/03/2024    CREATININE 1.02 07/03/2024     Lab Results   Component Value Date    WBC 5.06 07/03/2024    HGB 14.3 07/03/2024    HCT 42.9 07/03/2024    MCV 91 07/03/2024     07/03/2024

## 2025-01-02 ENCOUNTER — PATIENT MESSAGE (OUTPATIENT)
Age: 35
End: 2025-01-02

## 2025-01-02 ENCOUNTER — TELEPHONE (OUTPATIENT)
Age: 35
End: 2025-01-02

## 2025-01-02 NOTE — TELEPHONE ENCOUNTER
Patient called asking when would be best to drop off FMLA paper work to be filled out and returned. Patient wanted to know if they could email it or upload it to [employer]. Patient would need this done asap.     Please advise out to patient.

## 2025-01-03 ENCOUNTER — OFFICE VISIT (OUTPATIENT)
Dept: UROLOGY | Facility: MEDICAL CENTER | Age: 35
End: 2025-01-03
Payer: COMMERCIAL

## 2025-01-03 VITALS
OXYGEN SATURATION: 98 % | DIASTOLIC BLOOD PRESSURE: 80 MMHG | BODY MASS INDEX: 21.28 KG/M2 | HEART RATE: 71 BPM | SYSTOLIC BLOOD PRESSURE: 120 MMHG | WEIGHT: 152 LBS | HEIGHT: 71 IN

## 2025-01-03 DIAGNOSIS — Z30.09 ENCOUNTER FOR VASECTOMY ASSESSMENT: Primary | ICD-10-CM

## 2025-01-03 PROCEDURE — 99203 OFFICE O/P NEW LOW 30 MIN: CPT

## 2025-01-03 RX ORDER — ALPRAZOLAM 1 MG/1
TABLET ORAL
Qty: 1 TABLET | Refills: 0 | Status: SHIPPED | OUTPATIENT
Start: 2025-01-03

## 2025-01-08 ENCOUNTER — OFFICE VISIT (OUTPATIENT)
Dept: INTERNAL MEDICINE CLINIC | Facility: OTHER | Age: 35
End: 2025-01-08
Payer: COMMERCIAL

## 2025-01-08 VITALS
BODY MASS INDEX: 21.56 KG/M2 | DIASTOLIC BLOOD PRESSURE: 80 MMHG | OXYGEN SATURATION: 98 % | HEART RATE: 81 BPM | WEIGHT: 154 LBS | HEIGHT: 71 IN | TEMPERATURE: 99 F | SYSTOLIC BLOOD PRESSURE: 116 MMHG

## 2025-01-08 DIAGNOSIS — F90.9 ATTENTION DEFICIT HYPERACTIVITY DISORDER (ADHD), UNSPECIFIED ADHD TYPE: Primary | ICD-10-CM

## 2025-01-08 DIAGNOSIS — F32.A DEPRESSION, UNSPECIFIED DEPRESSION TYPE: ICD-10-CM

## 2025-01-08 PROCEDURE — 99214 OFFICE O/P EST MOD 30 MIN: CPT | Performed by: NURSE PRACTITIONER

## 2025-01-08 NOTE — ASSESSMENT & PLAN NOTE
Controlled on Adderall to 10 mg tablet extended release  Follow-up in 6 months or sooner if needed  Controlled substance agreement on file      Consider trial off of this medication if patient continues to lose weight and has decreased appetite  Orders:    Ambulatory Referral to [facility] or Partial Psych Program; Future

## 2025-01-08 NOTE — ASSESSMENT & PLAN NOTE
Patient has been struggling with depression PTSD and anxiety, requesting time off of work will fill out forms while in office today with plan to follow-up in 1 month  -Referral to partial program  -Continue current regimen    Orders:    Ambulatory Referral to [facility] or Partial Psych Program; Future

## 2025-01-08 NOTE — PROGRESS NOTES
Name: Jaylen Angel      : 1990      MRN: [id number]  Encounter Provider: SAMEERA Olivia  Encounter Date: 2025   Encounter department: [facility]  :  Assessment & Plan  Depression, unspecified depression type  Patient has been struggling with depression PTSD and anxiety, requesting time off of work will fill out forms while in office today with plan to follow-up in 1 month  -Referral to partial program  -Continue current regimen    Orders:    Ambulatory Referral to [facility] or [facility] Psych Program; Future    Attention deficit hyperactivity disorder (ADHD), unspecified ADHD type  Controlled on Adderall to 10 mg tablet extended release  Follow-up in 6 months or sooner if needed  Controlled substance agreement on file      Consider trial off of this medication if patient continues to lose weight and has decreased appetite  Orders:    Ambulatory Referral to [facility] or [facility] Psych Program; Future             History of Present Illness     Patient presents today to follow up on anxiety/depression, ADHD and for annual physical.    [place] overwhelemed to go back to work after the holidays   His therpaist fells that he has PTSD from taking care of his mom  Feels anxious all the time   He reports all of his PTO he used for mental health days   He reports some financial concerns    Note from last office visit:  Former smoker- recently quit  Social alcohol abuse.     Anxiety/depression-was started on Lexapro at last office visit   Note from last office visit:He reports that he is having trouble focusing at work, he reports that he has trouble finishing tasks a times. He does reports some depression and the feelings of be overwhelemed at times due to his moms health.  This tends to effect his sleep and his attendance at work.    Has lost about 30lbs since last office visit, he reports decreased appetite since about August, he reports that he has been more  depressed lately    Last office visit we increased his Wellbutrin to 300 mg and in regards to depression and anxiety he feels uncontrolled, we had also started him on Adderall 10 mg tablet daily, he reports appetite decreased and insomnia  Reports overall since starting Adderall he is notices significant improvement     He reports a sharp pain at the bottom left of his head, he reports that it comes and goes for the last month, denies injury  He reports more fatigued lately, lightheaded with exertion, for about the past two months   He reports epigastric discomfort with fatigue, denies crushing chest pain, he does report some SOB      Feel uncoordinated and off balance at times, has been going on for the past year-was seen in the ER for this concern   CT brain-IMPRESSION:     No acute intracranial abnormality.  Denies auto immune disorder in his family     He reports urinary urgency, happens daily, for about a year   Denies new sexual partners, no concern for STD             Review of Systems   Constitutional:  Negative for activity change, appetite change, chills, diaphoresis and fever.   HENT:  Negative for congestion, ear discharge, ear pain, postnasal drip, rhinorrhea, sinus pressure, sinus pain and sore throat.    Eyes:  Negative for pain, discharge, itching and visual disturbance.   Respiratory:  Negative for cough, chest tightness, shortness of breath and wheezing.    Cardiovascular:  Negative for chest pain, palpitations and leg swelling.   Gastrointestinal:  Negative for abdominal pain, constipation, diarrhea, nausea and vomiting.   Endocrine: Negative for polydipsia, polyphagia and polyuria.   Genitourinary:  Negative for difficulty urinating, dysuria and urgency.   Musculoskeletal:  Negative for arthralgias, back pain and neck pain.   Skin:  Negative for rash and wound.   Neurological:  Negative for dizziness, weakness, numbness and headaches.   Psychiatric/Behavioral:  Positive for dysphoric mood. The  "patient is nervous/anxious.        Objective   /80 (BP Location: Left arm, Patient Position: Sitting, Cuff Size: Adult)   Pulse 81   Temp 99 °F (37.2 °C)   Ht 5' 10.5\" (1.791 m)   Wt 69.9 kg (154 lb)   SpO2 98%   BMI 21.78 kg/m²      Physical Exam  Constitutional:       General: He is not in acute distress.     Appearance: He is well-developed. He is not diaphoretic.   HENT:      Head: Normocephalic and atraumatic.      Right Ear: External ear normal.      Left Ear: External ear normal.      Nose: Nose normal.      Mouth/Throat:      Mouth: Mucous membranes are moist.      Pharynx: No oropharyngeal exudate or posterior oropharyngeal erythema.   Eyes:      General:         Right eye: No discharge.         Left eye: No discharge.      Conjunctiva/sclera: Conjunctivae normal.      Pupils: Pupils are equal, round, and reactive to light.   Neck:      Thyroid: No thyromegaly.   Cardiovascular:      Rate and Rhythm: Normal rate and regular rhythm.      Heart sounds: Normal heart sounds. No murmur heard.     No friction rub. No gallop.   Pulmonary:      Effort: Pulmonary effort is normal. No respiratory distress.      Breath sounds: Normal breath sounds. No stridor. No wheezing or rales.   Abdominal:      General: Bowel sounds are normal. There is no distension.      Palpations: Abdomen is soft.      Tenderness: There is no abdominal tenderness.   Musculoskeletal:      Cervical back: Normal range of motion and neck supple.   Lymphadenopathy:      Cervical: No cervical adenopathy.   Skin:     General: Skin is warm and dry.      Findings: No erythema or rash.   Neurological:      Mental Status: He is alert and oriented to person, place, and time.   Psychiatric:         Behavior: Behavior normal.         Thought Content: Thought content normal.         Judgment: Judgment normal.       "

## 2025-01-09 ENCOUNTER — OFFICE VISIT (OUTPATIENT)
Dept: PSYCHOLOGY | Facility: CLINIC | Age: 35
End: 2025-01-09
Payer: COMMERCIAL

## 2025-01-09 ENCOUNTER — OFFICE VISIT (OUTPATIENT)
Dept: PSYCHIATRY | Facility: CLINIC | Age: 35
End: 2025-01-09
Payer: COMMERCIAL

## 2025-01-09 VITALS
HEIGHT: 71 IN | DIASTOLIC BLOOD PRESSURE: 84 MMHG | WEIGHT: 154 LBS | SYSTOLIC BLOOD PRESSURE: 137 MMHG | BODY MASS INDEX: 21.56 KG/M2 | HEART RATE: 87 BPM

## 2025-01-09 DIAGNOSIS — F41.1 GAD (GENERALIZED ANXIETY DISORDER): ICD-10-CM

## 2025-01-09 DIAGNOSIS — F90.0 ATTENTION DEFICIT HYPERACTIVITY DISORDER, INATTENTIVE TYPE: ICD-10-CM

## 2025-01-09 DIAGNOSIS — F33.1 MODERATE EPISODE OF RECURRENT MAJOR DEPRESSIVE DISORDER (HCC): Primary | ICD-10-CM

## 2025-01-09 DIAGNOSIS — F33.1 MAJOR DEPRESSIVE DISORDER, RECURRENT, MODERATE (HCC): Primary | ICD-10-CM

## 2025-01-09 DIAGNOSIS — F90.0 ADHD (ATTENTION DEFICIT HYPERACTIVITY DISORDER), INATTENTIVE TYPE: ICD-10-CM

## 2025-01-09 PROCEDURE — 90791 PSYCH DIAGNOSTIC EVALUATION: CPT

## 2025-01-09 PROCEDURE — G0410 GRP PSYCH PARTIAL HOSP 45-50: HCPCS

## 2025-01-09 PROCEDURE — 90837 PSYTX W PT 60 MINUTES: CPT

## 2025-01-09 PROCEDURE — G0176 OPPS/PHP;ACTIVITY THERAPY: HCPCS

## 2025-01-09 PROCEDURE — 90792 PSYCH DIAG EVAL W/MED SRVCS: CPT | Performed by: STUDENT IN AN ORGANIZED HEALTH CARE EDUCATION/TRAINING PROGRAM

## 2025-01-09 RX ORDER — DEXTROAMPHETAMINE SACCHARATE, AMPHETAMINE ASPARTATE MONOHYDRATE, DEXTROAMPHETAMINE SULFATE AND AMPHETAMINE SULFATE 2.5; 2.5; 2.5; 2.5 MG/1; MG/1; MG/1; MG/1
10 CAPSULE, EXTENDED RELEASE ORAL EVERY MORNING
Status: SHIPPED
Start: 2025-01-09 | End: 2025-01-16

## 2025-01-09 RX ORDER — HYDROXYZINE HYDROCHLORIDE 25 MG/1
25 TABLET, FILM COATED ORAL EVERY 6 HOURS PRN
Status: SHIPPED
Start: 2025-01-09 | End: 2025-01-16

## 2025-01-09 RX ORDER — ESCITALOPRAM OXALATE 20 MG/1
20 TABLET ORAL DAILY
Qty: 60 TABLET | Refills: 0 | Status: SHIPPED | OUTPATIENT
Start: 2025-01-09 | End: 2025-01-16

## 2025-01-09 NOTE — PSYCH
Visit Time    Start time: 0930  End time: 1030  Total time: 5 minutes  Date: 01/09/2025    Subjective:    Patient ID: Jaylen Angel 34 y.o. male    [facility] Clinical Progress Notes      Specialized Services Documentation  Therapist must complete separate progress note for each specific clinical activity in which the individual participated during the day.     Allied Therapy  Jaylen Angel was absent for the majority of this group, but was present for the last 5 minutes.    Objectives: 1.1, 1.2, 1.4  Therapist: UDAY Harden
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
## 2025-01-09 NOTE — BH TREATMENT PLAN
Patient ID: Jaylen Angel  is a 34 y.o. male .       Assessment/Plan:          Diagnoses and all orders for this visit:    Major depressive disorder, recurrent, moderate (HCC)    LEANA (generalized anxiety disorder)    Attention deficit hyperactivity disorder, inattentive type                [facility] Treatment Plan   AREAS OF NEED: Jaylen has been struggling with symptoms related to MDD, LEANA, and ADHD as evidenced by anhedonia, intrusive SI, increased anxiety, reduced concentration, weight loss, and impulsive behaviors due to multiple past traumas and current stressors.   Date Initiated: 01/09/25     Strengths: Empathy     LONG TERM GOAL:   Date Initiated: 01/09/25  1.0 While at [facility], I will gain support/education/insights/coping skills/techniques which I will utilize daily to decrease my symptoms and improve my quality of life.  Target Date: 02/06/25  Completion Date:         SHORT TERM OBJECTIVES:      Date Initiated: 01/09/25  1.1 I will begin practicing (daily) at least one technique to help reduce my depressive symptoms. (ie Opposite Action, Building Mastery, Cognitive Distortions, Trait Gratitude, Sleep Hygiene, Affirmations, etc.)  Revision Date:   Target Date: 01/20/25  Completion Date:      Date Initiated: 01/09/25  1.2 I will begin utilizing at least 2 tooks (twice per week) that will assist me in reducing triggers, anxiety, and impulsive reactions. (ie Emotional Freedom Tapping, Journaling, Cognitive Distortions, the STOP skill, Wise Mind, the TIPP skill, Urge Surfing, Breathing Techniques, etc.)  Revision Date:   Target Date:  01/20/25  Completion Date:     Date Initiated: 01/09/25  1.3 I will take medications as prescribed and share questions and concerns if they arise.    Revision Date:   Target Date:  01/20/25  Completion Date:      Date Initiated: 01/09/25  1.4 I will identify 3 ways my supports can assist in my recovery and agree to use them when/if needed.    Revision Date:   Target  Date:  01/20/25  Completion Date:            8 DAY REVISION:     Date Initiated:   1.5   Revision Date:   Target Date:  Completion Date:           PSYCHIATRY:  Date Initiated:  01/09/25  Medication Management and Education       Revision Date:       The person(s) responsible for carrying out the plan is Dxa Radford MD & SAMEERA Renee     NURSING/SYMPTOM EDUCATION:  Date Initiated:  01/09/25      1.1, 1.2. 1.3, 1.4 Provide wellness/symptoms and skill education groups three to five days weekly to educate Jaylen Angel on signs and symptoms of diagnoses, skills to manage stressors, and medication questions that will be addressed by the treatment team.        Revision date:              The person(s) responsible for carrying out the plan is Dale Elmore MS    PSYCHOLOGY 01/09/25   Date Initiated: 02/19/24       1.1, 1.2, 1.4 Provide psychotherapy group 5 times per week to allow opportunity for Jaylen Angel  to explore stressors and ways of coping.   Revision Date:      The person(s) responsible for carrying out the plan is SERENITY Sena     ALLIED THERAPY:   Date Initiated:  01/09/25  1.1,1.2 Engage Jaylen Angel in AT group 5 times daily to encourage development and use of wellness tools to decrease symptoms and promote recovery through meaningful activity.  Revision Date:          The person(s) responsible for carrying out the plan is UDAY Harris, UDAY Zaldivar, & Casie BUCKLEY     CASE MANAGEMENT:   Date Initiated:  01/09/25      1.0 This  will meet with Jaylen Angel  3-4 times weekly to assess treatment progress, discharge planning, connection to community supports and UR as indicated.  Revision Date:      The person(s) responsible for carrying out the plan is Dale Elmore MS     TREATMENT REVIEW/COMMENTS:      DISCHARGE CRITERIA: Identify 3 signs of progress and complete the safety plan.    DISCHARGE PLAN: Connect with identified outpatient  providers.   Estimated Length of Stay: 10 treatment days          Diagnosis and Treatment Plan explained to   Jaylen Angel   . Jaylen Angel  relates understanding diagnosis and is agreeable to Treatment Plan.         CLIENT COMMENTS / Please share your thoughts, feelings, need and/or experiences regarding your treatment plan with Staff.  Please see follow up note with comments.      Signatures can be found on [facility] Treatment plan consent form.

## 2025-01-09 NOTE — PSYCH
Subjective:    Patient ID: Jaylen Angel is a 34 y.o. male    [facility] Clinical Progress Notes      Specialized Services Documentation  Therapist must complete separate progress note for each specific clinical activity in which the individual participated during the day.     Visit Time    Start Time: 0830  End Time: 0930  Total Duration: 0 minutes     EDUCATION THERAPY    Jaylen Angel was not present for this session due to completing the Intake Process.     Tx Plan Objective: 1.1,1.2,1.4,  Therapist: CRISTIAN Dang     Group Psychotherapy     Visit Time    Start Time: 1045  End Time: 1145  Total Duration: 60 minutes    Jaylen Angel participated actively in a psychotherapy group focused on Self-Forgiveness. The group engaged in an open discussion about what self-forgiveness meant to them and the harm and benefits of it. The group also shared their individual experiences with self-forgiveness.  taught the steps to take towards it. Then group members worked on “Moving Toward Self-Forgiveness” sheet that reviews the steps in implementing it. One aspect of this exercise requires making a Pledge of Commitment to state regularly.  had each member write their commitment on a sticky note. Jaylen shared they will place this pledge in on their rearview mirror because it would be metaphorical but realized that would not be safe. He stated he would place it on the console of his car.  also shared self-forgiveness affirmations to aide in this process. Jaylen shared that they identified with the “I deserve to speak kindly to myself” affirmation. Jaylen Angel made some efforts towards progress goals which were displayed through note taking, participation in discussion, and engagement in topic. Continue to run daily group psychotherapy to meet treatment needs and encourage Jaylen Angel to practice skills outside of program.        Tx Plan Objective: 1.1,1.2,1.4,   Therapist:  CRISTIAN Dang, [facility]       Group Psychotherapy     Visit Time    Start Time: 1330  End Time: 1430  Total Duration: 60 minutes    Jaylen Angel participated actively in psychotherapy group.  This was observed Consistent throughout the treatment day. They were engaged in learning related to Illness and Wellness Tools. Staff utilized Verbal, Written, A/V, and Demonstration teaching methods. Jaylen Angel reported feeling anxious. Jaylen Angel shared area of learning and set a goal for outside of program to return to program. Jaylen Angel participated in an open discussion about the day's topics. Jaylen Angel demonstrated some progress toward goal. Continue group psychotherapy to actively practice learned skills and encourage transfer of knowledge to unstructured time.      Tx Plan Objective: 1.1,1.2,1.4,   Therapist: CRISTIAN Dang, MS

## 2025-01-09 NOTE — PSYCH
Visit Time    Visit Start Time: 0850  Visit Stop Time: 0950  Total Visit Duration:  60 minutes    Subjective:     Patient ID: Jaylen Angel is a 34 y.o. y.o. male.    [facility] Clinical Progress Notes      Specialized Services Documentation  Therapist must complete separate progress note for each specific clinical activity in which the individual participated during the day.       Case Management Note    Current suicide risk : Low     Medications changes/added/denied? Yes     Treatment session number: 1    Individual Case Management Visit provided today? Yes     [facility] follow up physician's orders: Admit to [facility] [place] today, 01/08/25- Dr. Radford        INDIVIDUAL PSYCHOTHERAPY     Jaylen Angel actively shared in individual therapy. This writer met with Jaylen Angel to review program expectations/schedule, fill out and sign ROIs, and acquire the standard intake information. Please see this writer's initial evaluation note for more information.     Treatment Plan Objectives addressed: Tx plan created based on this meeting      Dale Elmore

## 2025-01-09 NOTE — PSYCH
"Psychiatric Evaluation  [facility]   Acute Partial Hospitalization Program  Jaylen Angel 34 y.o. male MRN: [id number]     Chief Complaint: I have been feeling more overwhelmed    HPI:  Jaylen Angel is a 34 y.o. male with past psychiatric history of attention deficit hyperactivity disorder, generalized anxiety disorder, major depressive disorder is admitted to partial hospital program referred by PCP.    Jaylen Angel reports that over the past few weeks he has been feeling \"just unhappy in life\".  He reports that his depression and anxiety symptoms have been increased.  He reports that he is been suffering with depression and anxiety symptoms intermittently for many years, stemming back to his first depressive episode at age 18-19 when in college.  He reports that currently he is feeling more down, low, hopeless and sad.  Reports that he has been experiencing insomnia, difficulties initiating and maintaining sleep as well as some decreased appetite.  He reports he has had approximate 40 pound weight loss over the past year.  He reports that he just is not having as much of an appetite at this time.  He reports some decreased energy and decreased motivation.  Finds that he is not doing activities that he found as enjoyable, playing less video games and spending less time playing guitar.  He also notes that he has been more isolated and not interacting as much with friends.  He reports that in terms of anxiety symptoms he feels that he tends to over think and worry about things frequently throughout the day most days.  He reports some intermittent irritability and feeling tense/on edge.  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  He reports that at those times he feels as though he is \"having a heart attack and describes increased heart rate and a sense of impending doom.  He does describe some " fidgetiness and restlessness, reports that he is constantly biting his nails or picking at his skin when nervous or anxious.  He reports that he does carry a history of ADHD with difficulty focusing and concentrating, mostly inattentive symptoms that have been improved with the addition of Adderall a few years back.  He denies any history of auditory or visual hallucinations.  Denies any paranoia or delusional content.  Denies any suicidal or homicidal ideations.  Denies any OCD symptoms.  No history of eating disorders.  No history of manic or hypomanic symptoms.  No history of trauma or PTSD endorsed.    Psychosocial Stressors: work stress, mother's health    Current Rating Scores:   Current PHQ-9   PHQ-2/9 Depression Screening             Review Of Systems:  Constitutional negative   ENT negative   Cardiovascular negative   Respiratory negative   Gastrointestinal negative   Genitourinary negative   Musculoskeletal negative   Integumentary negative   Neurological negative   Endocrine negative   Other Symptoms none, all other systems are negative         Past Psychiatric History:    Previous inpatient psychiatric admissions: None.  Present/previous outpatient psychiatric treatment: PCP prescribing medications, SAMEERA Barron.  Present/previous psychotherapy: Ta Fernandez LPC.  History of suicidal attempts/gestures: Denied.  History of violence/aggressive behaviors: Denied.  Present/previous psychotropic medication use: Wellbutrin, Lexapro, Adderall, Xanax.    Past Medical History:  No past medical history on file.     Past Surgical History:   Procedure Laterality Date    NO PAST SURGERIES       No Known Allergies    Medications:     Current Outpatient Medications:     ALPRAZolam (XANAX) 1 mg tablet, Take one tablet by mouth one hour prior to vasectomy. Need  to/from appt., Disp: 1 tablet, Rfl: 0    amphetamine-dextroamphetamine (ADDERALL XR, 10MG,) 10 MG 24 hr capsule, Take 1 capsule (10 mg total)  "by mouth every morning Max Daily Amount: 10 mg, Disp: , Rfl:     escitalopram (LEXAPRO) 20 mg tablet, Take 1 tablet (20 mg total) by mouth daily, Disp: 60 tablet, Rfl: 0    hydrOXYzine HCL (ATARAX) 25 mg tablet, Take 1 tablet (25 mg total) by mouth every 6 (six) hours as needed for anxiety, Disp: , Rfl:     Family History:     Family History   Problem Relation Age of Onset    Stroke Mother     Depression Mother     No Known Problems Father     No Known Problems Sister        Social History:  Academic history:  Associate's degree  Marital history:   Children: 0  Social support system: significant other  Residential history: Resides in [place]; lives with wife  Vocational History:  at pharmaceutical company  Access to firearms: denies direct access to weapons/firearms.   Legal History: denied    Traumatic History:   Abuse:  enied  Other Traumatic Events:  assisting with mother's care post stroke    Substance Abuse History:    Social History     Substance and Sexual Activity   Drug Use Yes    Types: Marijuana       Nicotine Use: denied; former smoker   Alcohol Use: drinks 3-4 beer per week  Cannabis Use: daily use; inhalation route; has medical cannabis card  Illicit Substance Use: denied    Vital Signs:    Vitals:    01/09/25 1024   BP: 137/84   Pulse: 87   Weight: 69.9 kg (154 lb)   Height: 5' 10.5\" (1.791 m)       Mental status:  Appearance age appropriate, casually dressed, bearded   Behavior cooperative, calm   Speech normal rate, normal volume, normal pitch   Mood anxious   Affect constricted   Thought Processes organized, goal directed   Associations intact associations   Thought Content no overt delusions   Perceptual Disturbances: no auditory hallucinations, no visual hallucinations   Abnormal Thoughts  Risk Potential Suicidal ideation - None  Homicidal ideation - None  Potential for aggression - No   Orientation oriented to person, place, time/date, and situation   Memory " recent and remote memory grossly intact   Consciousness alert and awake   Attention Span Concentration Span attention span and concentration are age appropriate   Intellect appears to be of average intelligence   Insight intact   Judgement intact   Muscle Strength and  Gait normal muscle strength and normal muscle tone, normal gait and normal balance   Motor activity no abnormal movements   Fund of Knowledge adequate knowledge of current events  adequate fund of knowledge regarding past history  adequate fund of knowledge regarding vocabulary      Pain none   Pain Scale 0       Laboratory Results: I have personally reviewed all pertinent laboratory/tests results.    Summary:  Jaylen Angel is a 34-year-old male who carries a past psychiatric history significant for major depressive disorder and generalized anxiety disorder as well as attention deficit hyperactivity disorder who presents to [facility], referred by PCP Amy Lake due to increased depression symptoms.  Patient reports that symptoms of depression gradually worsening over the past few weeks stemming from various work stresses and issues with his personal life.  He reports anhedonia, anergia, poor motivation, appetite decreased with weight loss and energy levels as well as increased anxiety with frequent rumination and excessive worry.    We discussed his benefit from partial program to improve coping skills and better insight.  Would benefit from increasing Lexapro to a higher dose to target depression and anxiety symptoms.  He was in agreement to this plan.    Assessment & Plan  Moderate episode of recurrent major depressive disorder (HCC)    Orders:    escitalopram (LEXAPRO) 20 mg tablet; Take 1 tablet (20 mg total) by mouth daily    LEANA (generalized anxiety disorder)    Orders:    escitalopram (LEXAPRO) 20 mg tablet; Take 1 tablet (20 mg total) by mouth daily    hydrOXYzine HCL (ATARAX) 25 mg tablet; Take 1 tablet (25 mg total) by mouth  every 6 (six) hours as needed for anxiety    ADHD (attention deficit hyperactivity disorder), inattentive type    Orders:    amphetamine-dextroamphetamine (ADDERALL XR, 10MG,) 10 MG 24 hr capsule; Take 1 capsule (10 mg total) by mouth every morning Max Daily Amount: 10 mg        Medications Risks/Benefits:      Risks, Benefits And Possible Side Effects Of Medications:    Risks, benefits, and possible side effects of medications explained to Jaylen including risk of suicidality and serotonin syndrome related to treatment with antidepressants and risks of cardiovascular side effects including elevated blood pressure, risk of misuse, abuse or dependence and risk of increased anxiety related to treatment with stimulant medications. He verbalizes understanding and agreement for treatment. .    Controlled Medication Discussion:     Jaylen has been filling controlled prescriptions on time as prescribed according to [place] Prescription Drug Monitoring Program      [facility] Physician's Orders:    Admit to: Partial Hospitalization, 5 x per week, for 15 days.   Vital signs routine.  Diet: Regular.   Group Psychotherapy 9 x per week.   Allied Therapy Group 6 x per week.  Individual Therapy as needed  Family Therapy as needed  Diagnosis:   1. Moderate episode of recurrent major depressive disorder (HCC)  escitalopram (LEXAPRO) 20 mg tablet      2. LEANA (generalized anxiety disorder)  escitalopram (LEXAPRO) 20 mg tablet    hydrOXYzine HCL (ATARAX) 25 mg tablet      3. ADHD (attention deficit hyperactivity disorder), inattentive type  amphetamine-dextroamphetamine (ADDERALL XR, 10MG,) 10 MG 24 hr capsule            “I certify that the continuation of Partial Hospitalization services is medically necessary to improve and/or maintain the patient’s condition and functional level, and to prevent relapse or hospitalization, and that this could not be done at a less intensive level of care.”     This note was not shared with  the patient due to reasonable likelihood of causing patient harm     Visit Time    Visit Start Time: 0935  Visit Stop Time: 1020  Total Visit Duration:  45 minutes

## 2025-01-09 NOTE — PSYCH
Subjective:     Patient ID: Jaylen Angel 34 y.o. Male    [facility] Clinical Progress Notes      Specialized Services Documentation  Therapist must complete separate progress note for each specific clinical activity in which the individual participated during the day.      Group Psychotherapy - Attachment Styles     Visit Time    Visit Start Time: 1215  Visit Stop Time: 1315  Total Visit Duration: 60 Minutes     Jaylen Angel actively participated in a psychoeducational and therapeutic processing group on Attachment and Attachment Styles. The group learned about how attachment is formed early in life with their primary caregiver and the way in which one sees the world as safe vs. Unsafe. They spent time discussing ways in which their primary caregiver demonstrated availability to them growing up and how support, protection, and times of distress were managed by their caregivers. Next group members were provided the names of attachment styles in (Anxious/Preoccupied, Avoidant/Dismissive Disorganized/Fearful Avoidant, and Secure), and went over each. The group learned how attachment styles and the availability of our primary caregiver impact our present relationships. Additionally, group members learned about the Philosophies of Love from Tunisian thinkers and how it relates to attachment. Lastly, group members were engaged in conversation about ways in which they can change their attachment style though assertive communication, being your authentic self, identifying your triggers, learn how to self soothe, and learn how to stop react and to be proactive. Dr. Higginbotham used the following structure to support and lead the group discussion for processing, handouts, active listening, peer support, and psychoeducation Jaylen Angel made beginning progress noted towards goal.  Continue with psychoeducation to further emphasize the importance of secure attachments, in maintaining mental health and fostering  interpersonal relationships.     Tx Plan Objective 1.1, 1.2, 1.4; Psychiatrist: Dr. Germain Higginbotham MD & Therapist: SERENITY Velasquez.     Not applicable

## 2025-01-10 ENCOUNTER — OFFICE VISIT (OUTPATIENT)
Dept: PSYCHOLOGY | Facility: CLINIC | Age: 35
End: 2025-01-10
Payer: COMMERCIAL

## 2025-01-10 DIAGNOSIS — F90.0 ATTENTION DEFICIT HYPERACTIVITY DISORDER, INATTENTIVE TYPE: ICD-10-CM

## 2025-01-10 DIAGNOSIS — F41.1 GAD (GENERALIZED ANXIETY DISORDER): ICD-10-CM

## 2025-01-10 DIAGNOSIS — F33.1 MAJOR DEPRESSIVE DISORDER, RECURRENT, MODERATE (HCC): Primary | ICD-10-CM

## 2025-01-10 PROCEDURE — G0410 GRP PSYCH PARTIAL HOSP 45-50: HCPCS

## 2025-01-10 PROCEDURE — G0176 OPPS/PHP;ACTIVITY THERAPY: HCPCS

## 2025-01-10 PROCEDURE — G0177 OPPS/PHP; TRAIN & EDUC SERV: HCPCS

## 2025-01-10 PROCEDURE — 90832 PSYTX W PT 30 MINUTES: CPT

## 2025-01-10 NOTE — PSYCH
Subjective:    Patient ID: Jaylen Angel is a 34 y.o. male    [facility] Clinical Progress Notes      Specialized Services Documentation  Therapist must complete separate progress note for each specific clinical activity in which the individual participated during the day.     Visit Time    Start Time: 0830  End Time: 0930  Total Duration: 60 minutes     EDUCATION THERAPY    Jaylen Angel actively shared in morning assessment and goal review. Presented as Receptive related to readiness to learn. Jaylen Angel did complete goal from last treatment day identifying gaining hope, advocacy, responsibility, and support. Jaylen Angel did not present with a potential barrier to learning. Jaylen Angel reported feeling grateful. Jaylen Angel made some progress toward goal. Continue education group to assess willingness to engage, assess transfer of knowledge, and participate in skill development.    Tx Plan Objective: 1.1,1.2,1.4,  Therapist: CRISTIAN Dang    Group Psychotherapy     Visit Time    Start Time: 1215  End Time: 1315  Total Duration: 60 minutes    Jaylen Angel participated actively in a psychotherapy group focused on Creative Journaling. Journaling is the process of documenting one's feelings about their day or an incident. Sometimes this can be difficult to do because one does not know where to start. Through Creative Journaling, the group members are taught the benefits (new coping skill, creates a safe outlet, promotes self-awareness, track/reflect on progress, and offer clarity) and how to properly engage in it (incorporating it into your routine, being honest, not worrying about grammar/spelling mistakes and finding a private area). Group discussed several different ways to journal- i.e. doodling/drawing, a prompt and web clusters. Then everyone was  into smaller groups with each group assigned a different journaling activity- web cluster, freestyle/doodle or a prompt.  Jaylen shared that he used to journal but stopped when someone read it. The violation of privacy has made him apprehensive although he knows the benefits of it. He was receptive to 's suggestions on how to hide/password protect your journal. Jaylen stated he was more open to resume journaling on a weekly basis and through voice/video recording. Jaylen Angel made some efforts towards progress goals which were displayed through note taking, participation in discussion, and engagement in topic. Continue to run daily group psychotherapy to meet treatment needs and encourage Jaylen Angel to practice skills outside of program.        Tx Plan Objective: 1.1,1.2,1.4,  Therapist:  CRISTIAN Dang

## 2025-01-10 NOTE — PSYCH
"Visit Time    Visit Start Time: 1045  Visit Stop Time: 1145  Total Visit Duration: 60 minutes    Subjective:     Patient ID: Jaylen Angel is 34 y.o. y.o. male.    [facility] Clinical Progress Notes      Specialized Services Documentation  Therapist must complete separate progress note for each specific clinical activity in which the individual participated during the day.     Allied Therapy    Jaylen Angel actively shared in At group exploring facets of wellness through the weekly wellness inventory.   \"\" shared in the exploration of physical, emotional, cognitive, personal development, social, spiritual, and activities of daily living growth. Participants were asked to assess their engagement in several active ways to live these areas of wellness. He engaged in a stretching exercise, an object meditation, STOP technique, and GLAD journaling. offered tools including the Eisenhower Matrix and Emotions Journal strategies.  offered feedback and worked to make connections to each area of wellness. He identified the need to explore time management. Beginning progress toward goal. Continue AT to explore strategies and awareness for active engagement in one's wellness journey.    Tx Plan Objective: 1.1, 1.2, Therapist:  Angie FRYE       "

## 2025-01-10 NOTE — PSYCH
"Visit Time    Visit Start Time: 1330  Visit Stop Time: 1430  Total Visit Duration:  60 minutes    Subjective:     Patient ID: Jaylen Angel is a 34 y.o. male.    [facility] Clinical Progress Notes      Specialized Services Documentation  Therapist must complete separate progress note for each specific clinical activity in which the individual participated during the day.       GROUP PSYCHOTHERAPY    Jaylen Angel participated actively in psychotherapy group.  This was observed Consistent throughout the treatment day. They were engaged in learning related to Wellness Tools. Staff utilized Verbal, Written, and Demonstration teaching methods.  Jaylen Angel shared area of learning and set a goal for outside of program to fix the  the kitchen.  Jaylen Angel was able to share items explored during the day and shared in adding to their WRAP.  Ended session with staff led exercise in meditation and stretching.  Jaylen Angel demonstrated positive progress toward goal.  Continue group psychotherapy to actively practice learned skills and encourage transfer of knowledge to unstructured time.    Tx Plan Objective: 1.1, 1.2, 1.3, 1.4 , Therapist: Ziggy Riddle    Group Psychotherapy      Visit Start Time: (930)  Visit Stop Time: (1030)  Total Visit Duration:  60 minutes      Group Therapy    Subjective:     Patient ID: Jaylen Angel 34 y.o.     This group was facilitated in a private office   Jaylen Angel actively  engaged in psychoeducational group about radical acceptance. The skill helps an individual to learn to accept situations that are out of ones control. Reducing denial of situations and reducing distress..Group discovered strategies that help them to manage emotional well being on a short term and long term basis. The introduction of the concept of \"wise mid\" is used in this process.The group talked about understanding the purpose and meaning radical acceptance with  \"wise mind\" ,regulation , " recognition, and how it affects themselves and others..Teaching on the emphasis of radical acceptance, who the group can go to for help was brought up as well. Group was encouraged to ask questions in an open forum at the end of group. Positive progress displayed through engagement in topic,Jaylen was an active participant in the batista mind meditation. Jaylen Angel will continue to engage in psychotherapy to encourage positive self realization.  Treatment Plan Objective 1.1, 1.2, 1.3, 1.4 Therapist: Ziggy DUBON Ed.

## 2025-01-10 NOTE — PSYCH
[facility] Insurance Authorization for Treatment      Call Start Time: 1000  Call Stop Time: 1016  Total Visit Duration:  16 minutes    Subjective:     Patient ID: Jaylen Angel is a 34 y.o. male.    Phone call placed to [facility]  Phone number: [phone]  Tax ID and/or NPI used Tax ID [id number]  Location: [address] [place] [address]  Spoke to Ed  Code Used for Authorization: none requested  OPEN AUTH (unlimited for 1 year) Approved 01/09/25 through 01/09/2026  Level of Care PHP    No URs needed, call [phone] with discharge clinicals    Authorization # [id number]    Therapist: Dale Elmore MS

## 2025-01-10 NOTE — PSYCH
"Visit Time    Visit Start Time: 1200  Visit Stop Time: 1215  Total Visit Duration:  15 minutes    Subjective:     Patient ID: Jaylen Angel is a 34 y.o. y.o. male.    [facility] Clinical Progress Notes      Specialized Services Documentation  Therapist must complete separate progress note for each specific clinical activity in which the individual participated during the day.       Case Management Note    Current suicide risk : Low     Medications changes/added/denied? No    Treatment session number: 2    Individual Case Management Visit provided today? Yes     [facility] follow up physician's orders: None, next medication check will be next week with SAMEERA Renee.          INDIVIDUAL PSYCHOTHERAPY     Jaylen Angel actively shared in individual therapy.   Jaylen Angel identified that he's found program to be enjoyable thus far and has been getting \"therapatized\" pretty well. This writer reviewed Jaylen's insurance auth with him, reviewed the tx plan, and signed the tx plan. Continue individual psychotherapy in order to explore needs and begin implementation.      Treatment Plan Objectives addressed: 1.0, 1.1, 1.2, 1.3, 1.4      Dale Elmore"

## 2025-01-13 ENCOUNTER — OFFICE VISIT (OUTPATIENT)
Dept: PSYCHOLOGY | Facility: CLINIC | Age: 35
End: 2025-01-13
Payer: COMMERCIAL

## 2025-01-13 DIAGNOSIS — F41.1 GAD (GENERALIZED ANXIETY DISORDER): ICD-10-CM

## 2025-01-13 DIAGNOSIS — F33.1 MAJOR DEPRESSIVE DISORDER, RECURRENT, MODERATE (HCC): Primary | ICD-10-CM

## 2025-01-13 DIAGNOSIS — F90.0 ATTENTION DEFICIT HYPERACTIVITY DISORDER, INATTENTIVE TYPE: ICD-10-CM

## 2025-01-13 PROCEDURE — G0410 GRP PSYCH PARTIAL HOSP 45-50: HCPCS

## 2025-01-13 PROCEDURE — G0176 OPPS/PHP;ACTIVITY THERAPY: HCPCS

## 2025-01-13 NOTE — PSYCH
"Subjective:    Patient ID: Jaylen Angel is a 34 y.o. male    [facility] Clinical Progress Notes      Specialized Services Documentation  Therapist must complete separate progress note for each specific clinical activity in which the individual participated during the day.     Group Psychotherapy     Visit Time    Start Time: 0930  End Time: 1030  Total Duration: 60 minutes    Jaylen Angel participated actively in a psychotherapy group focused on pression- the meaning, symptoms and different types.  introduced the topic through a Symptoms of Depression worksheet- to engage the group in an open discussion about symptoms they experience.  then explained several different types of depression- Major Depressive, BiPolar, Postpartum, Situational, Seasonal, and Bipolar. Jaylen participated in sharing their symptoms of Depression. He shared experiencing as young as [age] years old and that his father would say things trying to be \"supportive\" but that were not. Then  discussed Opposite Action as a coping skill and reviewed the steps on how to use it.  introduced a second coping skill- Behavioral Activation and the steps required.  discussed the goal of this is to increase positive behaviors and to decrease the negative ones. Jaylen expressed interest in using Behavioral Activation. Jaylen Angel made some efforts towards progress goals which were displayed through note taking, participation in discussion, and engagement in topic. Continue to run daily group psychotherapy to meet treatment needs and encourage Jaylen Angel to practice skills outside of program.        Tx Plan Objective: 1.1,1.2,1.4,  Therapist:  CRISTIAN Dang        Group Psychotherapy     Visit Time    Start Time: 1330  End Time: 1430  Total Duration: 60 minutes    Jaylen Angel participated actively in psychotherapy group.  This was observed Consistent throughout the " treatment day. They were engaged in learning related to Illness and Wellness Tools. Staff utilized Verbal, Written, and Demonstration teaching methods. Jaylencarrillo Angel reported feeling tired and anxious. Jaylen Angel shared area of learning and set a goal for outside of program to wake up at 0700, do one good thing for himself and one important thing for himself. Jaylen L Angel participated in an open discussion about the day's topics. Jaylen Angel demonstrated some progress toward goal. Continue group psychotherapy to actively practice learned skills and encourage transfer of knowledge to unstructured time.      Tx Plan Objective: 1.1,1.2,1.4,   Therapist: CRISTIAN Dang Dr., Resident      negative normal/ROM intact/no calf tenderness/normal gait/strength 5/5 bilateral upper extremities/strength 5/5 bilateral lower extremities

## 2025-01-13 NOTE — PSYCH
Subjective:     Patient ID: Jaylen Angel is a 34 y.o. y.o. male.    [facility] Clinical Progress Notes      Specialized Services Documentation  Therapist must complete separate progress note for each specific clinical activity in which the individual participated during the day.       Case Management Note    Current suicide risk : Low     Not a case management day for Jaylen Angel today. Did not reach out with any additional questions and concerns and aware of next scheduled 1:1.     Medications changes/added/denied? No    Treatment session number: 3    Individual Case Management Visit provided today? No     [facility] follow up physician's orders: None, next medication check will be Friday with SAMEERA Renee.

## 2025-01-13 NOTE — PSYCH
"Subjective:    Patient ID: Jaylen Angel is a 34 y.o. male      [facility] Clinical Progress Notes      Specialized Services Documentation  Therapist must complete separate progress note for each specific clinical activity in which the individual participated during the day.       ALLIED THERAPY   Visit Start Time: 1045  Visit Stop Time: 1145  Total Visit Duration:  60 minutes    Jaylen Angel \"Michi\" verbally engaged and interacted in emotion regulation skills group. Group began by discussing how they currently take care of their physical needs before being introduced to the PLEASE skill. PLEASE stands for treat Physical iLlness, balance Eating, avoid mood Altering substances, balance Sleep, and get Exercise. Michi was observed engaged in self-reflection worksheet to determine how to apply the PLEASE skill to increase resiliency and decrease vulnerability to emotion mind. Michi identified he has been working on improving his eating habits by meal prepping. Positive beginning effort displayed towards treatment plan. Continue with group therapy to explore wellness strategies and encourage self-practice.    Tx Plan Objective: 1.1, 1.2, 1.3, 1.4, Therapist:  NINI Yee  "

## 2025-01-14 ENCOUNTER — OFFICE VISIT (OUTPATIENT)
Dept: PSYCHOLOGY | Facility: CLINIC | Age: 35
End: 2025-01-14
Payer: COMMERCIAL

## 2025-01-14 DIAGNOSIS — F41.1 GAD (GENERALIZED ANXIETY DISORDER): ICD-10-CM

## 2025-01-14 DIAGNOSIS — F33.1 MAJOR DEPRESSIVE DISORDER, RECURRENT, MODERATE (HCC): Primary | ICD-10-CM

## 2025-01-14 DIAGNOSIS — F90.0 ATTENTION DEFICIT HYPERACTIVITY DISORDER, INATTENTIVE TYPE: ICD-10-CM

## 2025-01-14 PROCEDURE — G0410 GRP PSYCH PARTIAL HOSP 45-50: HCPCS

## 2025-01-14 PROCEDURE — G0177 OPPS/PHP; TRAIN & EDUC SERV: HCPCS

## 2025-01-14 PROCEDURE — G0176 OPPS/PHP;ACTIVITY THERAPY: HCPCS
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
## 2025-01-14 NOTE — PSYCH
Visit Time    Visit Start Time: 0830  Visit Stop Time: 0930  Total Visit Duration:  60 minutes    Subjective:     Patient ID: Jaylen Angel is a 34 y.o. y.o. male.    [facility] Clinical Progress Notes      Specialized Services Documentation  Therapist must complete separate progress note for each specific clinical activity in which the individual participated during the day.       Case Management Note    Current suicide risk : Low     Medications changes/added/denied? No    Treatment session number: 4    Individual Case Management Visit provided today? Yes     [facility] follow up physician's orders: Self-reported        INDIVIDUAL PSYCHOTHERAPY     Jaylen Angel actively shared in individual therapy.   Jaylen Angel identified that program has been significantly more useful than thought. Jaylen explained that he's been taking more away from groups than anticipated. This writer reviewed Jaylen's initial plan to discharge from [facility] on day 5 of PHP, which is tomorrow. Jaylen reviewed what he wants to review prior to discharge. We spoke about his mother, her unrealistic expectations, and failed attempts at setting boundaries. Jaylen reflected on how those boundaries have been continuously broken. This writer discussed techniques which assist in advocacy and boundary setting. We also reviewed medications and how he's been feeling since increasing the dosage. This writer discussed the like ly reduction in side-effects over time, as it's been less than a week since starting the new dose. We spoke about multiple enjoyable activities/hobbies that Jaylen has been attempting to engage in..  This writer utilized  interventions.  DEAR MAN, Cognitive Distortions  assigned as homework and in session practiced cognitive distortions, thought records, opposite action, fair fighting, and dear man.  Good progress toward goal identified. Next session follow up to include review/practice of dear man. Continue  individual psychotherapy in order to guide Jaylen through further implementation/practice.                              Treatment Plan Objectives addressed: 1.1, 1.2, 1.3, 1.4      Dale Elmore
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
## 2025-01-14 NOTE — PSYCH
"Subjective:    Patient ID: Jaylen Angel is a 34 y.o. male      [facility] Clinical Progress Notes      Specialized Services Documentation  Therapist must complete separate progress note for each specific clinical activity in which the individual participated during the day.       ALLIED THERAPY   Visit Start Time: 0930  Visit Stop Time: 1030  Total Visit Duration:  50 minutes -arrived after case management    Jaylen Angel \"Michi\" was involved in skills group to facilitate positive thinking as a coping mechanism or as a motivator.  provided an overview of self-talk explaining how it can be so powerful that we can talk ourselves into doing things and out of doing things. Michi participated in group activity by changing two negative thoughts into positive thoughts. Group finished with “letting go” activity where Michi was encouraged to write down any negative aspects of self and “let them go” by ripping the paper and tossing it into the trash can. Some beginning work displayed towards treatment plan. When given the opportunity to share negative aspects, Michi shared his list with a smile on his face. He verbalized nicknames, failed relationships, and mistakes he made that still bother him. Continue with group therapy to identify and practice changing negative thoughts.   Tx Plan Objective: 1.1, 1.2, 1.4, Therapist:  NINI Yee  "

## 2025-01-14 NOTE — PSYCH
Subjective:    Patient ID: Jaylen Angel is a 34 y.o. male    [facility] Clinical Progress Notes      Specialized Services Documentation  Therapist must complete separate progress note for each specific clinical activity in which the individual participated during the day.     Visit Time    Start Time: 0830  End Time: 0930  Total Duration: 0 minutes     EDUCATION THERAPY    Jaylen Angel was not present in this session. He arrived late to program.     Tx Plan Objective: 1.1,1.2,1.4,  Therapist: CRISTIAN Dang    Group Psychotherapy     Visit Time    Start Time: 1330  End Time: 1430  Total Duration: 60 minutes    Jaylen Angel participated actively in psychotherapy group.  This was observed Consistent throughout the treatment day. They were engaged in learning related to Illness and Wellness Tools. Staff utilized Verbal, Written, A/V, and Demonstration teaching methods. Jaylen Angel reported feeling positive. Jaylen Angel shared area of learning and set a goal for outside of program to wake up  at 0715 tomorrow, cook dinner and play the guitar. Jaylen Angel participated in an open discussion about the day's topics. Jaylen Angel demonstrated some progress toward goal. Continue group psychotherapy to actively practice learned skills and encourage transfer of knowledge to unstructured time.      Tx Plan Objective: 1.1,1.2,1.4,  Therapist: CRISTIAN Dang, MS

## 2025-01-14 NOTE — PSYCH
"Visit Time    Visit Start Time: 1045  Visit Stop Time: 1145   Total Visit Duration: 60 minutes  Date: 01/14/2025    Subjective:     Patient ID: Jaylen Angel is a 34 y.o. y.o. male.    [facility] Clinical Progress Notes      Specialized Services Documentation  Therapist must complete separate progress note for each specific clinical activity in which the individual participated during the day.     Psychotherapy Group  Jaylen Angel Very actively (verbally, musically, receptively, and passively) shared in MTH group focused on boundary setting. The topic was introduced with a valerie analysis of \"Turning Tables\" by Jael. The group discussed the various types of boundaries that were discussed in these two vastly different songs. Jaylen was observed to be actively engaged in therapist led discussion on defining, exploring, and setting healthy boundaries. Porous Boundaries, Healthy Boundaries, and Rigid Boundaries were discussed and visuals were provided. Jaylen Angel participated by taking the Boundaries Quiz, and learned that they have porous boundaries. The group participated in small group discussions about the Pros/Cons of the different boundary styles. Positive effort noted toward treatment goal. Continue psychotherapy to encourage development and practice of creating and setting boundaries.      Tx Plan Objective: 1.1, 1.2, 1.4  Therapist: UDAY Zaldivar & CRISTIAN Dang  "

## 2025-01-15 ENCOUNTER — OFFICE VISIT (OUTPATIENT)
Dept: PSYCHOLOGY | Facility: CLINIC | Age: 35
End: 2025-01-15
Payer: COMMERCIAL

## 2025-01-15 DIAGNOSIS — F41.1 GAD (GENERALIZED ANXIETY DISORDER): ICD-10-CM

## 2025-01-15 DIAGNOSIS — F90.0 ATTENTION DEFICIT HYPERACTIVITY DISORDER, INATTENTIVE TYPE: ICD-10-CM

## 2025-01-15 DIAGNOSIS — F33.1 MAJOR DEPRESSIVE DISORDER, RECURRENT, MODERATE (HCC): Primary | ICD-10-CM

## 2025-01-15 PROCEDURE — G0410 GRP PSYCH PARTIAL HOSP 45-50: HCPCS

## 2025-01-15 PROCEDURE — G0177 OPPS/PHP; TRAIN & EDUC SERV: HCPCS

## 2025-01-15 NOTE — PSYCH
Subjective:     Patient ID: Jaylen Angel is a 34 y.o. male.    [facility] Clinical Progress Notes      Specialized Services Documentation  Therapist must complete separate progress note for each specific clinical activity in which the individual participated during the day.       Case Management Note    Dale Elmore MS    Current suicide risk : Low     Not a case management day for Jaylen Anegl today. Did not reach out with any additional questions and concerns and aware of next scheduled 1:1.     Medications changes/added/denied? No    Treatment session number: 6    Individual Case Management Visit provided today? No    [facility] follow up physician's orders: None, next medication check will be Friday with SAMEERA Renee.

## 2025-01-15 NOTE — PSYCH
Subjective:    Patient ID: Jaylen Angel is a 34 y.o. male    [facility] Clinical Progress Notes      Specialized Services Documentation  Therapist must complete separate progress note for each specific clinical activity in which the individual participated during the day.     Visit Time    Start Time: 0830  End Time: 0930  Total Duration: 40 minutes     EDUCATION THERAPY    Jaylen Angel arrived late to program. When present, he actively shared in morning assessment and goal review. Presented as Receptive related to readiness to learn. Jaylen Angel did complete goal from last treatment day identifying gaining education, responsibility, and support. Jaylen Angel did present with a potential barrier to learning. Jaylen Angel reported feeling bad about himself and was close to not coming to program. He shared he will be more mindful today to remind himself that he is a good person. Jaylen Angel made some progress toward goal. Continue education group to assess willingness to engage, assess transfer of knowledge, and participate in skill development.    Tx Plan Objective: 1.1,1.2,1.4,  Therapist: CRISTIAN Dang    Group Psychotherapy     Visit Time    Start Time: 1330  End Time: 1430  Total Duration: 60 minutes    Jaylen Angel participated actively in psychotherapy group.  This was observed Consistent throughout the treatment day. They were engaged in learning related to Illness and Wellness Tools. Staff utilized Verbal, Written, A/V, and Demonstration teaching methods. Jaylen Angel reported feeling anxious. Jaylen Angel shared area of learning and set a goal for outside of program to come to program tomorrow on time and to play the guitar or exercise. Jaylen Angel participated in an open discussion about the day's topics. Jaylen Angel participated in an affirmations alphabet activity. Jaylen Angel demonstrated some progress toward goal. Continue group psychotherapy to  actively practice learned skills and encourage transfer of knowledge to unstructured time.      Tx Plan Objective: 1.1,1.2,1.4,  Therapist: CRISTIAN Dang

## 2025-01-15 NOTE — PSYCH
"Visit Time    Start time: 1215   End time: 1315  Total time: 60 minutes   Date: 01/15/2025    Subjective:     Patient ID: Jaylen Angel is a 34 y.o. male.    [facility] Clinical Progress Notes      Specialized Services Documentation  Therapist must complete separate progress note for each specific clinical activity in which the individual participated during the day.     Psychotherapy Group  Jaylen Angel Actively (verbally, receptively, and passively) participated in MTH group focused on affirmations. This group was divided into 2 parts, 1) Identifying Your Own Personal Affirmations, and 2) Identifying Affirmations for Other People. The group started with an evaluation of personal values through a \"Values Auction.\" Values were then turned into \"I\" statement affirmations. Group discussed ways that they currently use affirmations in their lives, and how our own words to ourselves matter. Jaylen Angel was observed to be engaged in therapist led \"tap backs\" activity as well as practicing writing three affirmations about themselves. Group discussed the value of affirmations and how they can play a part in our lives. Jaylen Angel identified \"I am adventurous,\" as a self-affirmation. Group concluded with song \"Try\" and group commented on which lyrics stood out to them. Jaylen Angel was observed to be very quiet towards the end of the session. Inconsistent effort noted toward treatment goal. Continue Psychotherapy to encourage development and practice of self-affirmations.      Tx Plan Objective: 1.1, 1.2, 1.4  Therapist: UDAY Zaldivar  "

## 2025-01-16 ENCOUNTER — OFFICE VISIT (OUTPATIENT)
Dept: PSYCHOLOGY | Facility: CLINIC | Age: 35
End: 2025-01-16
Payer: COMMERCIAL

## 2025-01-16 ENCOUNTER — OFFICE VISIT (OUTPATIENT)
Dept: PSYCHIATRY | Facility: CLINIC | Age: 35
End: 2025-01-16
Payer: COMMERCIAL

## 2025-01-16 DIAGNOSIS — F41.1 GAD (GENERALIZED ANXIETY DISORDER): ICD-10-CM

## 2025-01-16 DIAGNOSIS — F33.1 MODERATE EPISODE OF RECURRENT MAJOR DEPRESSIVE DISORDER (HCC): Primary | ICD-10-CM

## 2025-01-16 DIAGNOSIS — F90.0 ATTENTION DEFICIT HYPERACTIVITY DISORDER, INATTENTIVE TYPE: ICD-10-CM

## 2025-01-16 DIAGNOSIS — F33.1 MAJOR DEPRESSIVE DISORDER, RECURRENT, MODERATE (HCC): Primary | ICD-10-CM

## 2025-01-16 DIAGNOSIS — F90.0 ADHD (ATTENTION DEFICIT HYPERACTIVITY DISORDER), INATTENTIVE TYPE: ICD-10-CM

## 2025-01-16 PROCEDURE — G0410 GRP PSYCH PARTIAL HOSP 45-50: HCPCS

## 2025-01-16 PROCEDURE — G0177 OPPS/PHP; TRAIN & EDUC SERV: HCPCS

## 2025-01-16 PROCEDURE — 90833 PSYTX W PT W E/M 30 MIN: CPT | Performed by: STUDENT IN AN ORGANIZED HEALTH CARE EDUCATION/TRAINING PROGRAM

## 2025-01-16 PROCEDURE — 99214 OFFICE O/P EST MOD 30 MIN: CPT | Performed by: STUDENT IN AN ORGANIZED HEALTH CARE EDUCATION/TRAINING PROGRAM

## 2025-01-16 RX ORDER — HYDROXYZINE HYDROCHLORIDE 25 MG/1
25 TABLET, FILM COATED ORAL EVERY 6 HOURS PRN
Status: SHIPPED
Start: 2025-01-16

## 2025-01-16 RX ORDER — DEXTROAMPHETAMINE SACCHARATE, AMPHETAMINE ASPARTATE MONOHYDRATE, DEXTROAMPHETAMINE SULFATE AND AMPHETAMINE SULFATE 5; 5; 5; 5 MG/1; MG/1; MG/1; MG/1
20 CAPSULE, EXTENDED RELEASE ORAL EVERY MORNING
Qty: 30 CAPSULE | Refills: 0 | Status: SHIPPED | OUTPATIENT
Start: 2025-01-16 | End: 2025-02-15

## 2025-01-16 RX ORDER — ESCITALOPRAM OXALATE 20 MG/1
20 TABLET ORAL DAILY
Status: SHIPPED
Start: 2025-01-16 | End: 2025-03-17

## 2025-01-16 NOTE — PSYCH
Subjective:    Patient ID: Jaylen Angel is a 34 y.o. male    [facility] Clinical Progress Notes      Specialized Services Documentation  Therapist must complete separate progress note for each specific clinical activity in which the individual participated during the day.     Group Psychotherapy     Visit Time    Start Time: 0930  End Time: 1030  Total Duration: 60 minutes    Jaylen Angel participated actively in a psychotherapy group focused on Trust of Self. In an open discussion, group explored the meaning of trust, the benefits of having it (i.e. confidence and better decision making) and the obstacles that prevent one from having trust (i.e. fear of making the wrong choice and past events). Jaylen shared how his upbringing led to him having difficulty trusting himself and that spilled over to his present relationships. Group explored the ways to develop trust of self (reconnect with oneself, practicing self-compassion, set reasonable goals, spend time alone and master a skill). Jaylen stated they would practice self-trust by reconnection with oneself and practice self-compassion. Jaylen Angel made some efforts towards progress goals which were displayed through note taking, participation in discussion, and engagement in topic. Continue with planned Jaylen Angel's discharge at the end of the day.     Tx Plan Objective: 1.1,1.2,1.4,  Therapist:  CRISTIAN Dang
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
## 2025-01-16 NOTE — PSYCH
Subjective:    Patient ID: Jaylen Angel is a 34 y.o. male      [facility] Clinical Progress Notes      Specialized Services Documentation  Therapist must complete separate progress note for each specific clinical activity in which the individual participated during the day.     EDUCATION THERAPY  Visit Start Time: 0830  Visit Stop Time: 0930  Total Visit Duration:  45 minutes    Jaylen Angel arrived to program late. When present, Jaylen Angel actively shared in morning assessment and goal review. Presented as Receptive related to readiness to learn. Jaylen Angel  did complete goal from last treatment day identifying gaining hope, education, advocacy, responsibility, and support. Jaylen Angel did not present with any barriers to learning. Throughout morning group, Jaylen Angel participated in mindfulness exercise. Jaylen Angel made some progress toward goal. Continue education group to assess willingness to engage, assess transfer of knowledge, and participate in skill development.    Tx Plan Objective: 1.1, 1.2, 1.4, Therapist: NINI Yee

## 2025-01-16 NOTE — PSYCH
"Visit Time    Visit Start Time: 1045  Visit Stop Time: 1145  Total Visit Duration: 20 minutes (Jaylen Angel was in case management for a portion of this session)  Date: 01/16/2025    Subjective:     Patient ID: Jaylen Angel is a 34 y.o. male.    [facility] Clinical Progress Notes      Specialized Services Documentation  Therapist must complete separate progress note for each specific clinical activity in which the individual participated during the day.     Psychotherapy  Jaylen Angel Actively (verbally, receptively, and passively) participated in MTH group focused on Urge Surfing (while he was present.) Group started off with a valerie analysis of \"Chasing Pavements\" by Jael. Group discussed definition of urges, and methods of managing unwanted behaviors. The topic was discussed and and alternative coping mechanisms were presented. The group then participated in a breathing exercise to practice a relation technique for times for when urges arise. The group the discussed \"Triggers\" and completed the Triggers worksheet, defining:  Identifying Triggers and being able to give them a name  Becoming mindful of symptoms of a trigger  Places/People/Thoughts/Activities that can be potential triggers  Strategies to avoiding, reducing exposure, or coping directly with triggers.   Jaylen participated by taking notes, and asking/answering questions. Group ended with open discussion about the music exercise that happened in the group. Consistent effort noted toward treatment goals.     Tx Plan Objective: 1.1, 1.2, 1.4 Therapist: UDAY Zaldivar  "

## 2025-01-16 NOTE — PSYCH
Visit Time    Visit Start Time: 1105  Visit Stop Time: 1145  Total Visit Duration:  40 minutes    Subjective:     Patient ID: Jaylen Angel is a 34 y.o. y.o. male.    [facility] Clinical Progress Notes      Specialized Services Documentation  Therapist must complete separate progress note for each specific clinical activity in which the individual participated during the day.       Case Management Note    Current suicide risk : Low     Medications changes/added/denied? Yes     Treatment session number: 6    Individual Case Management Visit provided today? Yes     [facility] follow up physician's orders: Successful discharge from [facility] today, 1/16/25. - Dr. Radford        INDIVIDUAL PSYCHOTHERAPY     Jaylen Angel actively shared in individual therapy.   Jaylen Angel identified readiness for discharge today, rather than tomorrow as scheduled. Jaylen reviewed his progress/improvements in great detail. He was able to report new insights, new motivation, increased hopefulness, increased confidence/self-esteem, reduced rumination/racing thoughts, reduced depression, increased functioning, and implementation of multiple new skills/techniques from program. Jaylen reviewed his outpatient appointments, completed the safety plan, completed the GAD7 and PHQ9, and reviewed discharge instructions.  View the discharge summary for more info. Continue with discharge today..     Treatment Plan Objectives addressed: Tx plan completed today.      Dale Elmore

## 2025-01-16 NOTE — BH CRISIS PLAN
Client Name: Jaylen Angel       Client YOB: 1990    [facility] Safety Plan      Creation Date: 1/16/25 Update Date: 7/16/25   Created By: Dale Elmore       Step 1: Warning Signs:   Warning Signs   increased anxiety   increased heart rate   feeling trapped   racing thoughts   overwhelming depression            Step 2: Internal Coping Strategies:   Internal Coping Strategies   guitar   working on Setup games            Step 3: People and social settings that provide distraction:   Name Contact Information   friends in phone   family in phone            Step 4: People whom I can ask for help during a crisis:      Name Contact Information    friends in phone    family home, in phone      Step 5: Professionals or agencies I can contact during a crisis:      Clinican/Agency Name Phone Emergency Contact    therapist in phone         Crisis Phone Numbers:   Suicide Prevention Lifeline: Call or Text  704 Crisis Text Line: Text HOME to 369-403   Please note: Some [facility] do not have a separate number for Child/Adolescent specific crisis. If your county is not listed under Child/Adolescent, please call the adult number for your county      Adult Crisis Numbers: Child/Adolescent Crisis Numbers   [facility]: [phone] [facility]: [phone]   [facility]: [phone] [facility]: [phone]   [facility]: [phone] [place]: [phone]   [facility]: [phone] [place]: [phone]   [facility]: [phone]   [facility]: [phone]   [facility]: [phone]   [facility]: [phone] (daytime) [phone] (after hours, weekends, holidays)      Step 6: Making the environment safer (plan for lethal means safety):   Patient did not identify any lethal methods: Yes     Optional: What is most important to me and worth living for?   Myself, my wife, my family, extended  family, in-laws     [place] Safety Plan. Guadalupe Nam and Mu Martel. Used with permission of the authors.

## 2025-01-16 NOTE — PSYCH
Subjective:      Patient ID:Jaylen Angel 34 y.o. male     [facility] Clinical Progress Notes       Specialized Services Documentation  Therapist must complete separate progress note for each specific clinical activity in which the individual participated during the day.      Education Group     1215 to 1315   Total time 60 min.  - Jaylen Angel attended the psychoeducation group on Medication Management . Group members were educated by this writer on the medication management, medication tips, strategies to help them remember to take their medications and developed ideas to make it easier in the future. Information was given on how to prepare for a doctor appointment and how to advocate for self and work as a team with their doctor to promote positive outcomes and better understanding of medications and uses.  Provided education on classes of  psychotropic medications, mechanisms of action, common side effects, and expectations of treatment with psychotropic medications.  Group was encouraged to ask questions in an open forum.    Jaylen Angel displayed understanding through engagement in the topic with the group . For Jaylen Angel,  good progress toward goals was noted, through their engagement in group. Continue psychotherapy to encourage self-awareness and home practice of skills to support wellness.    Treatment Plan Objective 1.1, 1.2, 1.3, 1.4  led by SAMEERA Renee

## 2025-01-16 NOTE — PSYCH
Visit Time    Visit Start Time: 1330  Visit Stop Time: 1430  Total Visit Duration: 60 minutes    Subjective:     Patient ID: Jaylen Angel is a 34 y.o. male.    [facility] Clinical Progress Notes      Specialized Services Documentation  Therapist must complete separate progress note for each specific clinical activity in which the individual participated during the day.       GROUP PSYCHOTHERAPY    Jaylen Angel participated actively in psychotherapy group.  This was observed consistently throughout the treatment day. They were engaged in learning related to Illness and Wellness Tools. Staff utilized Verbal, A/V, and Demonstration teaching methods.  Jaylen Angel shared area of learning and set a goal for outside of program to get back to his new normal life.  Jaylen Angel was able to share items explored during the day and encouraged to add to their WRAP.  Ended session with staff led exercise of Emotional Freedom Tapping.  Jaylen Angel demonstrated Very good progress toward goal.  Continue with successful discharge today.    Tx Plan Objective: 1.1, Therapist: Dale Elmore

## 2025-01-16 NOTE — PSYCH
"Medication Management Progress Note  [facility]   Acute Partial Hospitalization Program  Jaylen Angel 34 y.o. male MRN: [id number]        Date of Visit: January 16, 2025    Reason for Visit: Follow-up Partial Hospital Program medication management     Chief Complaint: \"I am having some trouble with focus but things are going well otherwise\"    SUBJECTIVE:    Jaylen Angel is a 34 y.o., male, with a past psychiatric history significant for attention deficit hyperactivity disorder, generalized anxiety disorder, major depressive disorder, who presents for follow-up appointment for medication management. He is doing well with PHP. Feels that his depression and anxiety are improving. He tolerated the increase in the Lexapro and feels it has been helpful. Did have some minor increased anxiety symptoms after starting but he is tolerating it now. He feels that his focus and concentration remain decreased; feeling distracted and zoning out at times. He feels that his inattentive symptoms are more bothersome. He notes the Adderall is still helpful but feels the 10mg may not be enough. He denies any other side effects; no appetite suppression or weight loss. No tachycardia or palpitations. Denies any SI, HI, A/VH. He is motivated to get back to work in February but is hoping his attention may be better prior to that.    Psychiatric Review Of Systems:  Appetite: no  Adverse eating: no  Weight changes: increased  Insomnia/sleeplessness: no  Fatigue/anergy: no  Anhedonia/lack of interest: no  Attention/concentration: no  Psychomotor agitation/retardation: no  Somatic symptoms: no  Anxiety/panic attack: worrying intermittent  Eugenia/hypomania: no  Hopelessness/helplessness/worthlessness: no  Self-injurious behavior/high-risk behavior: no  Suicidal ideation: no  Homicidal ideation: no  Auditory hallucinations: no  Visual hallucinations: no  Other perceptual disturbances: no  Delusional thinking: " no  Obsessive/compulsive symptoms: no    Review Of Systems:      Constitutional negative   ENT negative   Cardiovascular negative   Respiratory negative   Gastrointestinal negative   Genitourinary negative   Musculoskeletal negative   Integumentary negative   Neurological negative   Endocrine negative   Other Symptoms none, all other systems are negative     History Review:   The following portions of the patient's history were reviewed and updated as appropriate: allergies, current medications, past family history, past medical history, past social history, past surgical history, and problem list. Previous progress notes/assessments from other providers reviewed as available.    Past Medical History:    History reviewed. No pertinent past medical history.     No Known Allergies    Substance Abuse History:    Social History     Substance and Sexual Activity   Alcohol Use Yes     Social History     Substance and Sexual Activity   Drug Use Yes    Types: Marijuana       Social History:    Social History     Socioeconomic History    Marital status: /Civil Union     Spouse name: Not on file    Number of children: Not on file    Years of education: Not on file    Highest education level: Not on file   Occupational History    Not on file   Tobacco Use    Smoking status: Former     Current packs/day: 0.00     Types: Cigarettes     Start date: 2017     Quit date: 1/21/2021     Years since quitting: 3.9    Smokeless tobacco: Never   Vaping Use    Vaping status: Former    Start date: 1/1/2021    Quit date: 12/1/2022    Substances: Nicotine   Substance and Sexual Activity    Alcohol use: Yes    Drug use: Yes     Types: Marijuana    Sexual activity: Not on file   Other Topics Concern    Not on file   Social History Narrative    Not on file     Social Drivers of Health     Financial Resource Strain: Not on file   Food Insecurity: Not on file   Transportation Needs: Not on file   Physical Activity: Not on file   Stress: Not  on file   Social Connections: Not on file   Intimate Partner Violence: Not on file   Housing Stability: Not on file       Family Psychiatric History:     Family History   Problem Relation Age of Onset    Stroke Mother     Depression Mother     No Known Problems Father     No Known Problems Sister             OBJECTIVE:     Vital signs in last 24 hours:    There were no vitals filed for this visit.    Mental Status Evaluation:    Appearance age appropriate, casually dressed   Behavior cooperative, calm   Speech normal rate, normal volume, normal pitch   Mood euthymic   Affect normal range and intensity, appropriate   Thought Processes organized, goal directed   Associations intact associations   Thought Content no overt delusions   Perceptual Disturbances: no auditory hallucinations, no visual hallucinations   Abnormal Thoughts  Risk Potential Suicidal ideation - None  Homicidal ideation - None  Potential for aggression - No   Orientation oriented to person, place, time/date, and situation   Memory recent and remote memory grossly intact   Consciousness alert and awake   Attention Span Concentration Span decreased attention span  decreased concentration   Intellect appears to be of average intelligence   Insight intact   Judgement intact   Muscle Strength and  Gait normal muscle strength and normal muscle tone, normal gait and normal balance   Motor activity no abnormal movements   Fund of Knowledge adequate knowledge of current events  adequate fund of knowledge regarding past history  adequate fund of knowledge regarding vocabulary    Pain none   Pain Scale 0       Laboratory Results: I have personally reviewed all pertinent laboratory/tests results    Recent Labs (last 4 months):   Office Visit on 11/12/2024   Component Date Value    Color, UA 11/12/2024 Yellow     Clarity, UA 11/12/2024 Clear     Specific Gravity, UA 11/12/2024 1.030     pH, UA 11/12/2024 6.0     Leukocytes, UA 11/12/2024 Trace (A)     Nitrite,  UA 11/12/2024 Negative     Protein, UA 11/12/2024 50 (1+) (A)     Glucose, UA 11/12/2024 Negative     Ketones, UA 11/12/2024 Negative     Urobilinogen, UA 11/12/2024 <2.0     Bilirubin, UA 11/12/2024 Negative     Occult Blood, UA 11/12/2024 Negative     RBC, UA 11/12/2024 None Seen     WBC, UA 11/12/2024 None Seen     Epithelial Cells 11/12/2024 Occasional     Bacteria, UA 11/12/2024 None Seen     MUCUS THREADS 11/12/2024 Innumerable (A)        Summary:  Jaylen Angel is a 34-year-old male who carries a past psychiatric history significant for major depressive disorder and generalized anxiety disorder as well as attention deficit hyperactivity disorder who [facility], referred by PCP Amy Lake due to increased depression symptoms.  Patient reports that symptoms of depression gradually worsening over the past few weeks stemming from various work stresses and issues with his personal life.  He reports anhedonia, anergia, poor motivation, appetite decreased with weight loss and energy levels as well as increased anxiety with frequent rumination and excessive worry.    1/16/25 He is feeling better with anxiety and depression; tolerated increase with Lexapro with only minor side effects. He is struggling with inattentive symptoms. He was in agreement to increase Adderall XR to 20mg to target this. Denied any side effects otherwise. He is completing PHP today and feels more future oriented.    Assessment & Plan  Moderate episode of recurrent major depressive disorder (HCC)    Orders:    escitalopram (LEXAPRO) 20 mg tablet; Take 1 tablet (20 mg total) by mouth daily    LEANA (generalized anxiety disorder)    Orders:    escitalopram (LEXAPRO) 20 mg tablet; Take 1 tablet (20 mg total) by mouth daily    hydrOXYzine HCL (ATARAX) 25 mg tablet; Take 1 tablet (25 mg total) by mouth every 6 (six) hours as needed for anxiety    ADHD (attention deficit hyperactivity disorder), inattentive type    Orders:     amphetamine-dextroamphetamine (ADDERALL XR, 20MG,) 20 MG 24 hr capsule; Take 1 capsule (20 mg total) by mouth every morning Max Daily Amount: 20 mg        Additonal Recommendations/Precautions:    Continue follow up with Amy BRASHER.  Discharge from [facility] today.        Medications Risks/Benefits      Risks, Benefits And Possible Side Effects Of Medications:    Risks, benefits, and possible side effects of medications explained to Jaylen including risk of suicidality and serotonin syndrome related to treatment with antidepressants and risks of cardiovascular side effects including elevated blood pressure, risk of misuse, abuse or dependence and risk of increased anxiety related to treatment with stimulant medications. He verbalizes understanding and agreement for treatment..    Controlled Medication Discussion:     Jaylen has been filling controlled prescriptions on time as prescribed according to [place] Prescription Drug Monitoring Program    Psychotherapy Provided:     Individual psychotherapy provided: Yes  Counseling was provided during the session today for 16 minutes.  Recent stressor including family conflict, family issues, stress at work, and everyday stressors discussed with Jaylen.   Coping skills reviewed with Jaylen.   Reassurance and supportive therapy provided.        This note was not shared with the patient due to reasonable likelihood of causing patient harm    Visit Time    Visit Start Time: 1143pm  Visit Stop Time: 1201pm  Total Visit Duration:  18 minutes        Dax Radford MD 01/16/25

## 2025-01-16 NOTE — PSYCH
[facility] Discharge Instructions:   Disposition: home  Address: [address] [place] [address] .     Diagnosis:Major depressive disorder, recurrent, moderate (HCC)     LEANA (generalized anxiety disorder)     Attention deficit hyperactivity disorder, inattentive type.     Allergies (Drug/Food):   Allergies   Allergen Reactions    Tegretol [Carbamazepine] Rash     Activity:  Follow up with PCP for return to work instructions/recommendations  Diet:no recommendations  Smoking Cessation:not a smoker   Diagnostic/Laboratory Orders: None  Vaccines: If you received a vaccine, please notify your family physician on your next visit. For more information, please call [phone].     Follow-up appointments/Referrals:     Continue medication management w/ PCP  SAMEERA Barron  [address] [id number]  P: [phone]   F: [phone]    Continue outpatient therapy (TBD)  Ta Fernandez  [address] [place] [address]  Phone: [phone]    ICM/CTT: None    [facility] [phone].  [facility]UT[facility] [phone]    [facility] Support Groups      [facility]   3rd Monday of the month   7-8:30 p.m.   [facility], [address] [place] [address]   Contact: [phone]   [facility]   4th Tuesday of the month   7 - 8:30 p.m. [address] [place] [address]   Contact: [phone]   [facility]   1st Monday of the month   7 - 8:30 p.m.   [place] [address]   Contact: [phone]   [facility] Support [facility]   3rd Monday of the month   7 - 8:30 p.m.   [address] [place] [address]   Contact: [phone]   [facility]   1st Monday of the month   7-8:30 p.m.   [place] [address]   Contact: [phone]        "  [facility]BSA   Depression, Bipolar, Support [place]  [email or web address]  [facility], [address], PRIYA [place]cdonald  In Person:    Every Wednesday 7:00 PM to 8:30 PM  [employer]: Rm. 115  Please contact our [place] & , Heather Espinal,  at [email or web address] to be put on  email list to stay up to date about DBSA.       [facility]: [address], [place] [address] / [phone]. - Your  was Dale Elmore MS and the  is Doretha Moore      Intake/Referral/Evaluation (Non-Emergency) *NON INSURED FOR FUNDING:   [facility]: [phone],   [facility]: [phone],   [facility]: [phone],   [place]: [phone] and  [facility]: [phone].      Crisis Intervention (Emergency) County Service:  [facility]: [phone],  [place]: [phone],   [place]: [phone],   [facility]: [phone],   [facility]: [phone],    [place]: [phone] and [facility]: [phone]. __________________________________________________________________     National Crisis Textline: text \"HOME\" to 016392  [facility] Helpline [phone] (5-554-333-[facility]) or Text \"helpline\" to 17934  National Suicide Crisis Hotline: 989 Call or Text     I, the undersigned, have received and understand the above instructions.        Patient/Rep Signature: __________________________________       Date/Time: ______________         Physician Signature: ____________________________________      Date/Time: ______________               Signature: ________________________________       Date/Time: ______________       "

## 2025-01-16 NOTE — PSYCH
Patient ID: Jaylen Angel  is a 34 y.o. male .        Assessment/Plan:          Diagnoses and all orders for this visit:     Major depressive disorder, recurrent, moderate (HCC)     LEANA (generalized anxiety disorder)     Attention deficit hyperactivity disorder, inattentive type                   [facility] Treatment Plan   AREAS OF NEED: Jaylen has been struggling with symptoms related to MDD, LEANA, and ADHD as evidenced by anhedonia, intrusive SI, increased anxiety, reduced concentration, weight loss, and impulsive behaviors due to multiple past traumas and current stressors.   Date Initiated: 01/09/25     Strengths: Empathy     LONG TERM GOAL:   Date Initiated: 01/09/25  1.0 While at [facility], I will gain support/education/insights/coping skills/techniques which I will utilize daily to decrease my symptoms and improve my quality of life.  Target Date: 02/06/25  Completion Date: 01/16/2025        SHORT TERM OBJECTIVES:      Date Initiated: 01/09/25  1.1 I will begin practicing (daily) at least one technique to help reduce my depressive symptoms. (ie Opposite Action, Building Mastery, Cognitive Distortions, Trait Gratitude, Sleep Hygiene, Affirmations, etc.)  Revision Date:   Target Date: 01/20/25  Completion Date:  01/16/2025     Date Initiated: 01/09/25  1.2 I will begin utilizing at least 2 tooks (twice per week) that will assist me in reducing triggers, anxiety, and impulsive reactions. (ie Emotional Freedom Tapping, Journaling, Cognitive Distortions, the STOP skill, Wise Mind, the TIPP skill, Urge Surfing, Breathing Techniques, etc.)  Revision Date:   Target Date:  01/20/25  Completion Date: 01/16/2025     Date Initiated: 01/09/25  1.3 I will take medications as prescribed and share questions and concerns if they arise.    Revision Date:   Target Date:  01/20/25  Completion Date:  01/16/2025     Date Initiated: 01/09/25  1.4 I will identify 3 ways my supports can assist in my recovery and agree to  use them when/if needed.    Revision Date:   Target Date:  01/20/25  Completion Date: 01/16/2025            8 DAY REVISION:     Date Initiated:   1.5   Revision Date:   Target Date:  Completion Date:           PSYCHIATRY:  Date Initiated:  01/09/25  Medication Management and Education       Revision Date:       The person(s) responsible for carrying out the plan is Dax Radford MD & SAMEERA Renee     NURSING/SYMPTOM EDUCATION:  Date Initiated:  01/09/25      1.1, 1.2. 1.3, 1.4 Provide wellness/symptoms and skill education groups three to five days weekly to educate Jaylen Angel on signs and symptoms of diagnoses, skills to manage stressors, and medication questions that will be addressed by the treatment team.        Revision date:              The person(s) responsible for carrying out the plan is Dale Elmore MS     PSYCHOLOGY 01/09/25   Date Initiated: 02/19/24       1.1, 1.2, 1.4 Provide psychotherapy group 5 times per week to allow opportunity for Jaylen Angel  to explore stressors and ways of coping.   Revision Date:      The person(s) responsible for carrying out the plan is SERENITY Sena     ALLIED THERAPY:   Date Initiated:  01/09/25  1.1,1.2 Engage Jaylen Angel in AT group 5 times daily to encourage development and use of wellness tools to decrease symptoms and promote recovery through meaningful activity.  Revision Date:          The person(s) responsible for carrying out the plan is UDAY Harris, UDAY Zaldivar, & Casie BUCKLEY     CASE MANAGEMENT:   Date Initiated:  01/09/25      1.0 This  will meet with Jaylen Angel  3-4 times weekly to assess treatment progress, discharge planning, connection to community supports and UR as indicated.  Revision Date:       The person(s) responsible for carrying out the plan is Dale Elmore MS     TREATMENT REVIEW/COMMENTS:  01/16/2025 tx plan completed due to successful discharge from [facility]      DISCHARGE CRITERIA: Identify 3 signs of progress and complete the safety plan.    DISCHARGE PLAN: Connect with identified outpatient providers.   Estimated Length of Stay: 10 treatment days          Diagnosis and Treatment Plan explained to   Jaylen Angel   . Jaylen Angel  relates understanding diagnosis and is agreeable to Treatment Plan.         CLIENT COMMENTS / Please share your thoughts, feelings, need and/or experiences regarding your treatment plan with Staff.  Please see follow up note with comments.        Signatures can be found on [facility] Treatment plan consent form.

## 2025-01-17 ENCOUNTER — APPOINTMENT (OUTPATIENT)
Dept: PSYCHOLOGY | Facility: CLINIC | Age: 35
End: 2025-01-17
Payer: COMMERCIAL

## 2025-01-23 ENCOUNTER — DOCUMENTATION (OUTPATIENT)
Dept: PSYCHOLOGY | Facility: CLINIC | Age: 35
End: 2025-01-23

## 2025-02-03 ENCOUNTER — OFFICE VISIT (OUTPATIENT)
Age: 35
End: 2025-02-03
Payer: COMMERCIAL

## 2025-02-03 VITALS
WEIGHT: 155.2 LBS | HEART RATE: 76 BPM | HEIGHT: 70 IN | TEMPERATURE: 98.6 F | BODY MASS INDEX: 22.22 KG/M2 | DIASTOLIC BLOOD PRESSURE: 86 MMHG | OXYGEN SATURATION: 99 % | SYSTOLIC BLOOD PRESSURE: 136 MMHG

## 2025-02-03 DIAGNOSIS — F32.A DEPRESSION, UNSPECIFIED DEPRESSION TYPE: ICD-10-CM

## 2025-02-03 DIAGNOSIS — F90.9 ATTENTION DEFICIT HYPERACTIVITY DISORDER (ADHD), UNSPECIFIED ADHD TYPE: Primary | ICD-10-CM

## 2025-02-03 PROCEDURE — 99213 OFFICE O/P EST LOW 20 MIN: CPT | Performed by: NURSE PRACTITIONER

## 2025-02-03 NOTE — PROGRESS NOTES
Name: Jaylen Angel      : 1990      MRN: [id number]  Encounter Provider: SAMEERA Olivia  Encounter Date: 2/3/2025   Encounter department: [facility] [place]  :  Assessment & Plan  Attention deficit hyperactivity disorder (ADHD), unspecified ADHD type  Controlled on Adderall to 20 mg tablet extended release  Follow-up in 6 months or sooner if needed  Controlled substance agreement on file                 Depression, unspecified depression type  -Completed partial program  -Controlled with Lexapro 20 mg tablet daily  -As needed Atarax                  History of Present Illness   Patient presents today to follow-up on anxiety depression and ADHD.  Patient completed partial program, Adderall and Lexapro were adjusted.  At this time he feels under much better control, and reports that he feels ready to return to work.    Note from last office visit:  Patient presents today to follow up on anxiety/depression, ADHD and for annual physical.    Gravelly overwhelemed to go back to work after the holidays   His therpaist fells that he has PTSD from taking care of his mom  Feels anxious all the time   He reports all of his PTO he used for mental health days   He reports some financial concerns    Note from last office visit:  Former smoker- recently quit  Social alcohol abuse.     Anxiety/depression-was started on Lexapro at last office visit   Note from last office visit:He reports that he is having trouble focusing at work, he reports that he has trouble finishing tasks a times. He does reports some depression and the feelings of be overwhelemed at times due to his moms health.  This tends to effect his sleep and his attendance at work.    Has lost about 30lbs since last office visit, he reports decreased appetite since about August, he reports that he has been more depressed lately    Last office visit we increased his Wellbutrin to 300 mg and in regards to depression and  "anxiety he feels uncontrolled, we had also started him on Adderall 10 mg tablet daily, he reports appetite decreased and insomnia  Reports overall since starting Adderall he is notices significant improvement     He reports a sharp pain at the bottom left of his head, he reports that it comes and goes for the last month, denies injury  He reports more fatigued lately, lightheaded with exertion, for about the past two months   He reports epigastric discomfort with fatigue, denies crushing chest pain, he does report some SOB      Feel uncoordinated and off balance at times, has been going on for the past year-was seen in the ER for this concern   CT brain-IMPRESSION:     No acute intracranial abnormality.  Denies auto immune disorder in his family     He reports urinary urgency, happens daily, for about a year   Denies new sexual partners, no concern for STD             Review of Systems   Constitutional:  Negative for activity change, appetite change, chills, diaphoresis and fever.   HENT:  Negative for congestion, ear discharge, ear pain, postnasal drip, rhinorrhea, sinus pressure, sinus pain and sore throat.    Eyes:  Negative for pain, discharge, itching and visual disturbance.   Respiratory:  Negative for cough, chest tightness, shortness of breath and wheezing.    Cardiovascular:  Negative for chest pain, palpitations and leg swelling.   Gastrointestinal:  Negative for abdominal pain, constipation, diarrhea, nausea and vomiting.   Endocrine: Negative for polydipsia, polyphagia and polyuria.   Genitourinary:  Negative for difficulty urinating, dysuria and urgency.   Musculoskeletal:  Negative for arthralgias, back pain and neck pain.   Skin:  Negative for rash and wound.   Neurological:  Negative for dizziness, weakness, numbness and headaches.       Objective   /86 (BP Location: Left arm, Patient Position: Sitting, Cuff Size: Standard)   Pulse 76   Temp 98.6 °F (37 °C) (Temporal)   Ht 5' 10.47\" (1.79 m) "   Wt 70.4 kg (155 lb 3.2 oz)   SpO2 99%   BMI 21.97 kg/m²      Physical Exam  Constitutional:       General: He is not in acute distress.     Appearance: He is well-developed. He is not diaphoretic.   HENT:      Head: Normocephalic and atraumatic.      Right Ear: External ear normal.      Left Ear: External ear normal.      Nose: Nose normal.      Mouth/Throat:      Mouth: Mucous membranes are moist.      Pharynx: No oropharyngeal exudate or posterior oropharyngeal erythema.   Eyes:      General:         Right eye: No discharge.         Left eye: No discharge.      Conjunctiva/sclera: Conjunctivae normal.      Pupils: Pupils are equal, round, and reactive to light.   Neck:      Thyroid: No thyromegaly.   Cardiovascular:      Rate and Rhythm: Normal rate and regular rhythm.      Heart sounds: Normal heart sounds. No murmur heard.     No friction rub. No gallop.   Pulmonary:      Effort: Pulmonary effort is normal. No respiratory distress.      Breath sounds: Normal breath sounds. No stridor. No wheezing or rales.   Abdominal:      General: Bowel sounds are normal. There is no distension.      Palpations: Abdomen is soft.      Tenderness: There is no abdominal tenderness.   Musculoskeletal:      Cervical back: Normal range of motion and neck supple.   Lymphadenopathy:      Cervical: No cervical adenopathy.   Skin:     General: Skin is warm and dry.      Findings: No erythema or rash.   Neurological:      Mental Status: He is alert and oriented to person, place, and time.   Psychiatric:         Behavior: Behavior normal.         Thought Content: Thought content normal.         Judgment: Judgment normal.

## 2025-02-03 NOTE — ASSESSMENT & PLAN NOTE
-Completed partial program  -Controlled with Lexapro 20 mg tablet daily  -As needed Atarax

## 2025-02-03 NOTE — ASSESSMENT & PLAN NOTE
Controlled on Adderall to 20 mg tablet extended release  Follow-up in 6 months or sooner if needed  Controlled substance agreement on file

## 2025-02-07 ENCOUNTER — PROCEDURE VISIT (OUTPATIENT)
Dept: UROLOGY | Facility: MEDICAL CENTER | Age: 35
End: 2025-02-07
Payer: COMMERCIAL

## 2025-02-07 VITALS
BODY MASS INDEX: 21.76 KG/M2 | HEIGHT: 70 IN | HEART RATE: 70 BPM | SYSTOLIC BLOOD PRESSURE: 120 MMHG | DIASTOLIC BLOOD PRESSURE: 80 MMHG | OXYGEN SATURATION: 97 % | WEIGHT: 152 LBS

## 2025-02-07 DIAGNOSIS — Z30.2 ENCOUNTER FOR VASECTOMY: Primary | ICD-10-CM

## 2025-02-07 DIAGNOSIS — Z98.52 VASECTOMY STATUS: ICD-10-CM

## 2025-02-07 PROBLEM — Z30.09 ENCOUNTER FOR VASECTOMY ASSESSMENT: Status: ACTIVE | Noted: 2022-10-27

## 2025-02-07 PROCEDURE — 55250 REMOVAL OF SPERM DUCT(S): CPT | Performed by: UROLOGY

## 2025-02-07 PROCEDURE — 88302 TISSUE EXAM BY PATHOLOGIST: CPT | Performed by: SPECIALIST

## 2025-02-07 NOTE — PATIENT INSTRUCTIONS
Rest for the next 48 to 72 hours.  Use ice packs-20 minutes on 20 minutes off until Sunday.  At that point you can switch to a heating pad or warm soaks.  Use Tylenol and ibuprofen around-the-clock for pain.  Take 2 extra strength Tylenol when you get home and then every 6 hours after this.  When the numbing medication starts to wear off you can then take 600 mg of ibuprofen and take this 3 times a day with food.  Remember you are not yet sterile.  No sex for 1 week and then when you do start remember you have to use birth control.  Call the office for fever, bleeding or progressive scrotal swelling.    Standard postop vasectomy sheet provided

## 2025-02-07 NOTE — LETTER
February 7, 2025     SAMEERA Olivia  [address]  [address]  [facility] [address]    Patient: Jaylen Angel   YOB: 1990   Date of Visit: 2/7/2025       Dear Dr. Lake:    Thank you for referring Jaylen Angel to me for evaluation. Below are my notes for this consultation.    If you have questions, please do not hesitate to call me. I look forward to following your patient along with you.         Sincerely,        Duy Trevizo MD        CC: No Recipients    Duy Trevizo MD  2/7/2025  8:24 AM  Sign when Signing Visit      Vasectomy     Date/Time  2/7/2025 7:30 AM     Performed by  Duy Trevizo MD   Authorized by  Duy Trevizo MD     [place] Protocol   Consent: Verbal consent obtained. Written consent obtained.  Risks and benefits: risks, benefits and alternatives were discussed  Consent given by: patient  Patient understanding: patient states understanding of the procedure being performed  Patient consent: the patient's understanding of the procedure matches consent given  Patient identity confirmed: verbally with patient      Local anesthesia used: yes      Anesthesia: local infiltration     Anesthesia   Local anesthesia used: yes  Local Anesthetic: lidocaine 2% without epinephrine and bupivacaine 0.5% without epinephrine  Anesthetic total: 10 mL     Sedation   Patient sedated: no        Specimen: yes    Culture: no   Procedure Details   Procedure Notes: The patient was brought to the procedure room and placed supine on the operating table.  He was identified and any questions answered.  He was then prepped and draped in the usual sterile fashion. The right vas was identified grasped and brought up to the midline. Local anesthesia was then administered. A small incision was made in the median raphae and dissected down to mindy vasal tissue.  More local anesthesia was administered. The vas was grasped in a vas clamp and externalized.  The mindy vasal tissue was  cleared and a segment of vas excised.  The proximal and distal lumens were then cauterized for a distance of approximately 1 cm.  Mindy vasal tissue was then interposed between the 2 vas ends using a clip.  Hemostasis was then confirmed and felt to be adequate.  The right vas was dropped down into the scrotum.  Next the left vas was identified and brought up to the incision.  More local anesthesia was administered. The left vas was grasped in the vas clamp.  The mindy vasal tissue was cleared and a segment of vas excised.  The lumens were then cauterized for a distance of 1 cm.  The mindy vasal tissue was then interposed between the 2 ends of the vas using a clip.  Hemostasis was achieved and reconfirmed.  The left vas was then dropped down into the incision.  A 3 0 chromic suture was then used in a subcuticular fashion to reapproximate the dartos muscle.  Hemostasis was adequate.  A sterile pressure dressing was then applied.  The patient tolerated the procedure well.  Blood loss was minimal.  The segments of vas were sent to pathology for analysis.  The patient got up from the table and dressed.  Discharge instructions were then given to the patient as per our discharge instruction sheet.  He left the procedure room in satisfactory condition.    Patient Transportation: confirmed  Patient tolerance: patient tolerated the procedure well with no immediate complications

## 2025-02-07 NOTE — PROGRESS NOTES
Vasectomy     Date/Time  2/7/2025 7:30 AM     Performed by  Duy Trevizo MD   Authorized by  Duy Trevizo MD     [place] Protocol   Consent: Verbal consent obtained. Written consent obtained.  Risks and benefits: risks, benefits and alternatives were discussed  Consent given by: patient  Patient understanding: patient states understanding of the procedure being performed  Patient consent: the patient's understanding of the procedure matches consent given  Patient identity confirmed: verbally with patient      Local anesthesia used: yes      Anesthesia: local infiltration     Anesthesia   Local anesthesia used: yes  Local Anesthetic: lidocaine 2% without epinephrine and bupivacaine 0.5% without epinephrine  Anesthetic total: 10 mL     Sedation   Patient sedated: no        Specimen: yes    Culture: no   Procedure Details   Procedure Notes: The patient was brought to the procedure room and placed supine on the operating table.  He was identified and any questions answered.  He was then prepped and draped in the usual sterile fashion. The right vas was identified grasped and brought up to the midline. Local anesthesia was then administered. A small incision was made in the median raphae and dissected down to mindy vasal tissue.  More local anesthesia was administered. The vas was grasped in a vas clamp and externalized.  The mindy vasal tissue was cleared and a segment of vas excised.  The proximal and distal lumens were then cauterized for a distance of approximately 1 cm.  Mindy vasal tissue was then interposed between the 2 vas ends using a clip.  Hemostasis was then confirmed and felt to be adequate.  The right vas was dropped down into the scrotum.  Next the left vas was identified and brought up to the incision.  More local anesthesia was administered. The left vas was grasped in the vas clamp.  The mindy vasal tissue was cleared and a segment of vas excised.  The lumens were then cauterized for a  distance of 1 cm.  The mindy vasal tissue was then interposed between the 2 ends of the vas using a clip.  Hemostasis was achieved and reconfirmed.  The left vas was then dropped down into the incision.  A 3 0 chromic suture was then used in a subcuticular fashion to reapproximate the dartos muscle.  Hemostasis was adequate.  A sterile pressure dressing was then applied.  The patient tolerated the procedure well.  Blood loss was minimal.  The segments of vas were sent to pathology for analysis.  The patient got up from the table and dressed.  Discharge instructions were then given to the patient as per our discharge instruction sheet.  He left the procedure room in satisfactory condition.    Patient Transportation: confirmed  Patient tolerance: patient tolerated the procedure well with no immediate complications

## 2025-02-10 PROCEDURE — 88302 TISSUE EXAM BY PATHOLOGIST: CPT | Performed by: SPECIALIST

## 2025-02-21 DIAGNOSIS — F90.0 ADHD (ATTENTION DEFICIT HYPERACTIVITY DISORDER), INATTENTIVE TYPE: ICD-10-CM

## 2025-02-21 NOTE — TELEPHONE ENCOUNTER
01/16/2025 01/16/2025 Mixed Amphetamine Salt (Capsule, Extended Release) 30.0 30 20 MG NA GORGE HURLEY [facility] Commercial Insurance 0 / 0 [place]      1 [id number] 01/03/2025 01/03/2025 ALPRAZolam (Tablet) 1.0 1 1 MG NA EMIL COLON [facility] Commercial Insurance 0 / 0 [place]    1 [id number] 12/30/2024 12/30/2024 Mixed Amphetamine Salt (Capsule, Extended Release) 30.0 30 10 MG NA GINGER AC [facility] Commercial Insurance 0 / 0 [place]    1 [id number] 11/12/2024 11/12/2024 Mixed Amphetamine Salt (Capsule, Extended Release) 30.0 30 10 MG NA GINGER AC [facility]

## 2025-02-24 RX ORDER — DEXTROAMPHETAMINE SACCHARATE, AMPHETAMINE ASPARTATE MONOHYDRATE, DEXTROAMPHETAMINE SULFATE AND AMPHETAMINE SULFATE 5; 5; 5; 5 MG/1; MG/1; MG/1; MG/1
20 CAPSULE, EXTENDED RELEASE ORAL EVERY MORNING
Qty: 30 CAPSULE | Refills: 0 | Status: SHIPPED | OUTPATIENT
Start: 2025-02-24 | End: 2025-03-26

## 2025-03-15 DIAGNOSIS — F41.1 GAD (GENERALIZED ANXIETY DISORDER): ICD-10-CM

## 2025-03-15 DIAGNOSIS — F33.1 MODERATE EPISODE OF RECURRENT MAJOR DEPRESSIVE DISORDER (HCC): ICD-10-CM

## 2025-03-17 RX ORDER — ESCITALOPRAM OXALATE 20 MG/1
20 TABLET ORAL DAILY
Qty: 30 TABLET | Refills: 1 | OUTPATIENT
Start: 2025-03-17

## 2025-03-21 ENCOUNTER — OFFICE VISIT (OUTPATIENT)
Age: 35
End: 2025-03-21
Payer: COMMERCIAL

## 2025-03-21 VITALS
SYSTOLIC BLOOD PRESSURE: 110 MMHG | BODY MASS INDEX: 22.05 KG/M2 | TEMPERATURE: 97.2 F | HEART RATE: 92 BPM | WEIGHT: 154 LBS | HEIGHT: 70 IN | OXYGEN SATURATION: 98 % | DIASTOLIC BLOOD PRESSURE: 76 MMHG

## 2025-03-21 DIAGNOSIS — Z00.00 ANNUAL PHYSICAL EXAM: Primary | ICD-10-CM

## 2025-03-21 DIAGNOSIS — Z13.228 SCREENING FOR METABOLIC DISORDER: ICD-10-CM

## 2025-03-21 DIAGNOSIS — E78.5 HYPERLIPIDEMIA, UNSPECIFIED HYPERLIPIDEMIA TYPE: ICD-10-CM

## 2025-03-21 DIAGNOSIS — Z87.891 FORMER SMOKER: ICD-10-CM

## 2025-03-21 PROCEDURE — 99395 PREV VISIT EST AGE 18-39: CPT | Performed by: NURSE PRACTITIONER

## 2025-03-21 NOTE — PATIENT INSTRUCTIONS
"Patient Education     Routine physical for adults   The Basics   Written by the doctors and editors at [facility]   What is a physical? -- A physical is a routine visit, or \"check-up,\" with your doctor. You might also hear it called a \"wellness visit\" or \"preventive visit.\"  During each visit, the doctor will:   Ask about your physical and mental health   Ask about your habits, behaviors, and lifestyle   Do an exam   Give you vaccines if needed   Talk to you about any medicines you take   Give advice about your health   Answer your questions  Getting regular check-ups is an important part of taking care of your health. It can help your doctor find and treat any problems you have. But it's also important for preventing health problems.  A routine physical is different from a \"sick visit.\" A sick visit is when you see a doctor because of a health concern or problem. Since physicals are scheduled ahead of time, you can think about what you want to ask the doctor.  How often should I get a physical? -- It depends on your age and health. In general, for people age 21 years and older:   If you are younger than 50 years, you might be able to get a physical every 3 years.   If you are 50 years or older, your doctor might recommend a physical every year.  If you have an ongoing health condition, like diabetes or high blood pressure, your doctor will probably want to see you more often.  What happens during a physical? -- In general, each visit will include:   Physical exam - The doctor or nurse will check your height, weight, heart rate, and blood pressure. They will also look at your eyes and ears. They will ask about how you are feeling and whether you have any symptoms that bother you.   Medicines - It's a good idea to bring a list of all the medicines you take to each doctor visit. Your doctor will talk to you about your medicines and answer any questions. Tell them if you are having any side effects that bother you. You " "should also tell them if you are having trouble paying for any of your medicines.   Habits and behaviors - This includes:   Your diet   Your exercise habits   Whether you smoke, drink alcohol, or use drugs   Whether you are sexually active   Whether you feel safe at home  Your doctor will talk to you about things you can do to improve your health and lower your risk of health problems. They will also offer help and support. For example, if you want to quit smoking, they can give you advice and might prescribe medicines. If you want to improve your diet or get more physical activity, they can help you with this, too.   Lab tests, if needed - The tests you get will depend on your age and situation. For example, your doctor might want to check your:   Cholesterol   Blood sugar   Iron level   Vaccines - The recommended vaccines will depend on your age, health, and what vaccines you already had. Vaccines are very important because they can prevent certain serious or deadly infections.   Discussion of screening - \"Screening\" means checking for diseases or other health problems before they cause symptoms. Your doctor can recommend screening based on your age, risk, and preferences. This might include tests to check for:   Cancer, such as breast, prostate, cervical, ovarian, colorectal, prostate, lung, or skin cancer   Sexually transmitted infections, such as chlamydia and gonorrhea   Mental health conditions like depression and anxiety  Your doctor will talk to you about the different types of screening tests. They can help you decide which screenings to have. They can also explain what the results might mean.   Answering questions - The physical is a good time to ask the doctor or nurse questions about your health. If needed, they can refer you to other doctors or specialists, too.  Adults older than 65 years often need other care, too. As you get older, your doctor will talk to you about:   How to prevent falling at " home   Hearing or vision tests   Memory testing   How to take your medicines safely   Making sure that you have the help and support you need at home  All topics are updated as new evidence becomes available and our peer review process is complete.  This topic retrieved from [facility] on: May 02, 2024.  Topic 121084 Version 1.0  Release: 32.4.3 - C32.122  © 2024 UpToDate, Inc. and/or its affiliates. All rights reserved.  Consumer Information Use and Disclaimer   Disclaimer: This generalized information is a limited summary of diagnosis, treatment, and/or medication information. It is not meant to be comprehensive and should be used as a tool to help the user understand and/or assess potential diagnostic and treatment options. It does NOT include all information about conditions, treatments, medications, side effects, or risks that may apply to a specific patient. It is not intended to be medical advice or a substitute for the medical advice, diagnosis, or treatment of a health care provider based on the health care provider's examination and assessment of a patient's specific and unique circumstances. Patients must speak with a health care provider for complete information about their health, medical questions, and treatment options, including any risks or benefits regarding use of medications. This information does not endorse any treatments or medications as safe, effective, or approved for treating a specific patient. UpToDate, Inc. and its affiliates disclaim any warranty or liability relating to this information or the use thereof.The use of this information is governed by the Terms of Use, available at https://www.woltersAquaBlokuwer.com/en/know/clinical-effectiveness-terms. 2024© UpToDate, Inc. and its affiliates and/or licensors. All rights reserved.  Copyright   © 2024 UpToDate, Inc. and/or its affiliates. All rights reserved.

## 2025-03-21 NOTE — PROGRESS NOTES
Adult Annual Physical  Name: Jaylen Angel      : 1990      MRN: [id number]  Encounter Provider: SAMEERA Olivia  Encounter Date: 3/21/2025   Encounter department: [facility] [place]    Assessment & Plan  Screening for metabolic disorder    Orders:    Comprehensive metabolic panel    CBC and differential    Lipid panel    Hyperlipidemia, unspecified hyperlipidemia type    Orders:    Lipid panel    Annual physical exam         Former smoker    Orders:    UA w Reflex to Microscopic w Reflex to Culture; Future    Preventive Screenings:  - Diabetes Screening: screening up-to-date  - Cholesterol Screening: screening up-to-date   - Hepatitis C screening: screening up-to-date   - HIV screening: screening up-to-date   - Colon cancer screening: screening not indicated   - Lung cancer screening: screening not indicated   - Prostate cancer screening: screening not indicated     Immunizations:    - Risks/benefits immunizations discussed      Counseling/Anticipatory Guidance:  - Alcohol: discussed moderation in alcohol intake and recommendations for healthy alcohol use.   - Drug use: discussed harms of illicit drug use and how it can negatively impact mental/physical health.   - Tobacco use: discussed harms of tobacco use and management options for quitting.   - Dental health: discussed importance of regular tooth brushing, flossing, and dental visits.   - Sexual health: discussed sexually transmitted diseases, partner selection, use of condoms, avoidance of unintended pregnancy, and contraceptive alternatives.   - Diet: discussed recommendations for a healthy/well-balanced diet.   - Exercise: the importance of regular exercise/physical activity was discussed. Recommend exercise 3-5 times per week for at least 30 minutes.   - Injury prevention: discussed safety/seat belts, safety helmets, smoke detectors, carbon monoxide detectors, and smoking near bedding or upholstery.           History of Present Illness     Adult Annual Physical:  Patient presents for annual physical. Patient presents today for annual physical.   He denies any new concerns.   .     Diet and Physical Activity:  - Diet/Nutrition: well balanced diet and limited junk food. has trouble eating a work due to stress  - Exercise: 1-2 times a week on average and less than 30 minutes on average.    Depression Screening:    - PHQ-9 Score: 4    General Health:  - Sleep: sleeps poorly and 4-6 hours of sleep on average.  - Hearing: normal hearing bilateral ears.  - Vision: most recent eye exam > 1 year ago and wears glasses.  - Dental: regular dental visits, brushes teeth twice daily and floss regularly.     Health:  - History of STDs: no.   - Urinary symptoms: none.     Advanced Care Planning:  - Has an advanced directive?: no    - Has a durable medical POA?: no    - ACP document given to patient?: no      Review of Systems   Constitutional:  Negative for activity change, appetite change, chills, diaphoresis and fever.   HENT:  Negative for congestion, ear discharge, ear pain, postnasal drip, rhinorrhea, sinus pressure, sinus pain and sore throat.    Eyes:  Negative for pain, discharge, itching and visual disturbance.   Respiratory:  Negative for cough, chest tightness, shortness of breath and wheezing.    Cardiovascular:  Negative for chest pain, palpitations and leg swelling.   Gastrointestinal:  Negative for abdominal pain, blood in stool, constipation, diarrhea, nausea and vomiting.   Endocrine: Negative for polydipsia, polyphagia and polyuria.   Genitourinary:  Negative for difficulty urinating, dysuria, hematuria and urgency.   Musculoskeletal:  Negative for arthralgias, back pain and neck pain.   Skin:  Negative for rash and wound.   Neurological:  Negative for dizziness, weakness, numbness and headaches.         Objective   /76 (BP Location: Left arm, Patient Position: Sitting, Cuff Size: Standard)   Pulse 92    "Temp (!) 97.2 °F (36.2 °C) (Temporal)   Ht 5' 10\" (1.778 m)   Wt 69.9 kg (154 lb)   SpO2 98%   BMI 22.10 kg/m²     Physical Exam  Constitutional:       General: He is not in acute distress.     Appearance: He is well-developed. He is not diaphoretic.   HENT:      Head: Normocephalic and atraumatic.      Right Ear: External ear normal.      Left Ear: External ear normal.      Nose: Nose normal.      Mouth/Throat:      Mouth: Mucous membranes are moist.      Pharynx: No oropharyngeal exudate or posterior oropharyngeal erythema.   Eyes:      General:         Right eye: No discharge.         Left eye: No discharge.      Conjunctiva/sclera: Conjunctivae normal.      Pupils: Pupils are equal, round, and reactive to light.   Neck:      Thyroid: No thyromegaly.   Cardiovascular:      Rate and Rhythm: Normal rate and regular rhythm.      Heart sounds: Normal heart sounds. No murmur heard.     No friction rub. No gallop.   Pulmonary:      Effort: Pulmonary effort is normal. No respiratory distress.      Breath sounds: Normal breath sounds. No stridor. No wheezing or rales.   Abdominal:      General: Bowel sounds are normal. There is no distension.      Palpations: Abdomen is soft.      Tenderness: There is no abdominal tenderness.   Musculoskeletal:      Cervical back: Normal range of motion and neck supple.   Lymphadenopathy:      Cervical: No cervical adenopathy.   Skin:     General: Skin is warm and dry.      Findings: No erythema or rash.   Neurological:      Mental Status: He is alert and oriented to person, place, and time.   Psychiatric:         Behavior: Behavior normal.         Thought Content: Thought content normal.         Judgment: Judgment normal.       "

## 2025-03-25 DIAGNOSIS — F33.1 MODERATE EPISODE OF RECURRENT MAJOR DEPRESSIVE DISORDER (HCC): ICD-10-CM

## 2025-03-25 DIAGNOSIS — F41.1 GAD (GENERALIZED ANXIETY DISORDER): ICD-10-CM

## 2025-03-25 RX ORDER — ESCITALOPRAM OXALATE 20 MG/1
20 TABLET ORAL DAILY
Qty: 90 TABLET | Refills: 0 | Status: SHIPPED | OUTPATIENT
Start: 2025-03-25 | End: 2025-05-24

## 2025-04-01 DIAGNOSIS — F90.0 ADHD (ATTENTION DEFICIT HYPERACTIVITY DISORDER), INATTENTIVE TYPE: ICD-10-CM

## 2025-04-02 RX ORDER — DEXTROAMPHETAMINE SACCHARATE, AMPHETAMINE ASPARTATE MONOHYDRATE, DEXTROAMPHETAMINE SULFATE AND AMPHETAMINE SULFATE 5; 5; 5; 5 MG/1; MG/1; MG/1; MG/1
20 CAPSULE, EXTENDED RELEASE ORAL EVERY MORNING
Qty: 30 CAPSULE | Refills: 0 | Status: SHIPPED | OUTPATIENT
Start: 2025-04-02 | End: 2025-05-02

## 2025-04-04 ENCOUNTER — TELEPHONE (OUTPATIENT)
Age: 35
End: 2025-04-04

## 2025-04-04 NOTE — TELEPHONE ENCOUNTER
We don't give out any medical information to places that want to speak with us on a patient.  They would need to fax over the forms to fill out on the patient with the patient's signature giving consent to get this information.

## 2025-04-04 NOTE — TELEPHONE ENCOUNTER
Hilda from [employer] called to confirm so medical information. She asked for a nurse to call. Call back number [phone]

## 2025-04-08 NOTE — TELEPHONE ENCOUNTER
Received forms from [employer] on this patient for his mental health issues.  Will scan into his chart and give the papers to Marianne

## 2025-04-16 ENCOUNTER — OFFICE VISIT (OUTPATIENT)
Dept: INTERNAL MEDICINE CLINIC | Facility: OTHER | Age: 35
End: 2025-04-16
Payer: COMMERCIAL

## 2025-04-16 VITALS
BODY MASS INDEX: 22.48 KG/M2 | SYSTOLIC BLOOD PRESSURE: 118 MMHG | HEIGHT: 70 IN | WEIGHT: 157 LBS | HEART RATE: 78 BPM | DIASTOLIC BLOOD PRESSURE: 74 MMHG | TEMPERATURE: 98 F | OXYGEN SATURATION: 98 %

## 2025-04-16 DIAGNOSIS — F32.A DEPRESSION, UNSPECIFIED DEPRESSION TYPE: Primary | ICD-10-CM

## 2025-04-16 DIAGNOSIS — Z30.09 ENCOUNTER FOR VASECTOMY ASSESSMENT: ICD-10-CM

## 2025-04-16 DIAGNOSIS — F41.9 ANXIETY: ICD-10-CM

## 2025-04-16 DIAGNOSIS — F90.9 ATTENTION DEFICIT HYPERACTIVITY DISORDER (ADHD), UNSPECIFIED ADHD TYPE: ICD-10-CM

## 2025-04-16 PROCEDURE — 99213 OFFICE O/P EST LOW 20 MIN: CPT | Performed by: NURSE PRACTITIONER

## 2025-04-16 RX ORDER — BUSPIRONE HYDROCHLORIDE 5 MG/1
5 TABLET ORAL 2 TIMES DAILY
Qty: 180 TABLET | Refills: 0 | Status: SHIPPED | OUTPATIENT
Start: 2025-04-16

## 2025-04-16 RX ORDER — ALPRAZOLAM 0.25 MG
0.25 TABLET ORAL
Qty: 30 TABLET | Refills: 0 | Status: SHIPPED | OUTPATIENT
Start: 2025-04-16

## 2025-04-16 NOTE — ASSESSMENT & PLAN NOTE
-Completed partial program  -Controlled with Lexapro 20 mg tablet daily  - Discontinue as needed Atarax changed to as needed Xanax

## 2025-04-16 NOTE — ASSESSMENT & PLAN NOTE
- Start BuSpar 5 mg twice daily    Orders:    ALPRAZolam (XANAX) 0.25 mg tablet; Take 1 tablet (0.25 mg total) by mouth daily at bedtime as needed for anxiety    busPIRone (BUSPAR) 5 mg tablet; Take 1 tablet (5 mg total) by mouth 2 (two) times a day

## 2025-04-16 NOTE — PROGRESS NOTES
Name: Jaylen Angel      : 1990      MRN: [id number]  Encounter Provider: SAMEERA Olivia  Encounter Date: 2025   Encounter department: [facility]  :  Assessment & Plan  Encounter for vasectomy assessment         Depression, unspecified depression type  -Completed partial program  -Controlled with Lexapro 20 mg tablet daily  - Discontinue as needed Atarax changed to as needed Xanax             Anxiety  - Start BuSpar 5 mg twice daily    Orders:    ALPRAZolam (XANAX) 0.25 mg tablet; Take 1 tablet (0.25 mg total) by mouth daily at bedtime as needed for anxiety    busPIRone (BUSPAR) 5 mg tablet; Take 1 tablet (5 mg total) by mouth 2 (two) times a day    Attention deficit hyperactivity disorder (ADHD), unspecified ADHD type  Controlled on Adderall to 20 mg tablet extended release  Follow-up in 6 months or sooner if needed  Controlled substance agreement on file                         Depression Screening and Follow-up Plan: Patient was screened for depression during today's encounter. They screened negative with a PHQ-9 score of 0.        History of Present Illness   Patient presents today to follow-up on anxiety depression and ADHD.  Patient completed partial program, Adderall and Lexapro were adjusted.  At this time he feels under much better control    Does report some breakthrough anxiety   Feeling like Atarax when he takes it makes him drowsy the next day     Forms filled out while in the office today     Note from last office visit:  Patient presents today to follow up on anxiety/depression, ADHD and for annual physical.    [place] overwhelemed to go back to work after the holidays   His therpaist fells that he has PTSD from taking care of his mom  Feels anxious all the time   He reports all of his PTO he used for mental health days   He reports some financial concerns    Note from last office visit:  Former smoker- recently quit  Social alcohol abuse.      Anxiety/depression-was started on Lexapro at last office visit   Note from last office visit:He reports that he is having trouble focusing at work, he reports that he has trouble finishing tasks a times. He does reports some depression and the feelings of be overwhelemed at times due to his moms health.  This tends to effect his sleep and his attendance at work.    Has lost about 30lbs since last office visit, he reports decreased appetite since about August, he reports that he has been more depressed lately    Last office visit we increased his Wellbutrin to 300 mg and in regards to depression and anxiety he feels uncontrolled, we had also started him on Adderall 10 mg tablet daily, he reports appetite decreased and insomnia  Reports overall since starting Adderall he is notices significant improvement     He reports a sharp pain at the bottom left of his head, he reports that it comes and goes for the last month, denies injury  He reports more fatigued lately, lightheaded with exertion, for about the past two months   He reports epigastric discomfort with fatigue, denies crushing chest pain, he does report some SOB      Feel uncoordinated and off balance at times, has been going on for the past year-was seen in the ER for this concern   CT brain-IMPRESSION:     No acute intracranial abnormality.  Denies auto immune disorder in his family     He reports urinary urgency, happens daily, for about a year   Denies new sexual partners, no concern for STD             Review of Systems   Constitutional:  Negative for activity change, appetite change, chills, diaphoresis and fever.   HENT:  Negative for congestion, ear discharge, ear pain, postnasal drip, rhinorrhea, sinus pressure, sinus pain and sore throat.    Eyes:  Negative for pain, discharge, itching and visual disturbance.   Respiratory:  Negative for cough, chest tightness, shortness of breath and wheezing.    Cardiovascular:  Negative for chest pain,  "palpitations and leg swelling.   Gastrointestinal:  Negative for abdominal pain, constipation, diarrhea, nausea and vomiting.   Endocrine: Negative for polydipsia, polyphagia and polyuria.   Genitourinary:  Negative for difficulty urinating, dysuria and urgency.   Musculoskeletal:  Negative for arthralgias, back pain and neck pain.   Skin:  Negative for rash and wound.   Neurological:  Negative for dizziness, weakness, numbness and headaches.   Psychiatric/Behavioral:  Negative for dysphoric mood and sleep disturbance. The patient is nervous/anxious.        Objective   /74 (BP Location: Left arm, Patient Position: Sitting, Cuff Size: Adult)   Pulse 78   Temp 98 °F (36.7 °C)   Ht 5' 10\" (1.778 m)   Wt 71.2 kg (157 lb)   SpO2 98%   BMI 22.53 kg/m²      Physical Exam  Constitutional:       General: He is not in acute distress.     Appearance: He is well-developed. He is not diaphoretic.   HENT:      Head: Normocephalic and atraumatic.      Right Ear: External ear normal.      Left Ear: External ear normal.      Nose: Nose normal.      Mouth/Throat:      Mouth: Mucous membranes are moist.      Pharynx: No oropharyngeal exudate or posterior oropharyngeal erythema.   Eyes:      General:         Right eye: No discharge.         Left eye: No discharge.      Conjunctiva/sclera: Conjunctivae normal.      Pupils: Pupils are equal, round, and reactive to light.   Neck:      Thyroid: No thyromegaly.   Cardiovascular:      Rate and Rhythm: Normal rate and regular rhythm.      Heart sounds: Normal heart sounds. No murmur heard.     No friction rub. No gallop.   Pulmonary:      Effort: Pulmonary effort is normal. No respiratory distress.      Breath sounds: Normal breath sounds. No stridor. No wheezing or rales.   Abdominal:      General: Bowel sounds are normal. There is no distension.      Palpations: Abdomen is soft.      Tenderness: There is no abdominal tenderness.   Musculoskeletal:      Cervical back: Normal " range of motion and neck supple.   Lymphadenopathy:      Cervical: No cervical adenopathy.   Skin:     General: Skin is warm and dry.      Findings: No erythema or rash.   Neurological:      Mental Status: He is alert and oriented to person, place, and time.   Psychiatric:         Behavior: Behavior normal.         Thought Content: Thought content normal.         Judgment: Judgment normal.

## 2025-04-18 ENCOUNTER — APPOINTMENT (OUTPATIENT)
Dept: LAB | Facility: CLINIC | Age: 35
End: 2025-04-18
Attending: UROLOGY
Payer: COMMERCIAL

## 2025-04-18 ENCOUNTER — RESULTS FOLLOW-UP (OUTPATIENT)
Dept: UROLOGY | Facility: MEDICAL CENTER | Age: 35
End: 2025-04-18

## 2025-04-18 DIAGNOSIS — Z98.52 VASECTOMY STATUS: ICD-10-CM

## 2025-04-18 LAB
DEPRECATED CD4 CELLS/CD8 CELLS BLD: 1.7 ML
SPERM MOTILE SMN QL MICRO: NORMAL

## 2025-04-18 PROCEDURE — 89321 SEMEN ANAL SPERM DETECTION: CPT

## 2025-04-28 ENCOUNTER — TELEPHONE (OUTPATIENT)
Age: 35
End: 2025-04-28

## 2025-04-28 NOTE — TELEPHONE ENCOUNTER
[employer] asking for start date of patient's FMLA and last patient's visit with PCP. Information provided.

## 2025-05-01 ENCOUNTER — TELEPHONE (OUTPATIENT)
Dept: PSYCHIATRY | Facility: CLINIC | Age: 35
End: 2025-05-01

## 2025-05-01 NOTE — TELEPHONE ENCOUNTER
A medical record request from the [employer] was sent to [facility] [employer].     The request was addressed to Dr Radford and had a Behavioral Health Assessment Form attached.

## 2025-05-02 ENCOUNTER — TELEPHONE (OUTPATIENT)
Age: 35
End: 2025-05-02

## 2025-05-02 NOTE — TELEPHONE ENCOUNTER
Received paperwork on this patient from [employer].  Will scan into the chart and give Marianne the forms to complete on this patient.     Will let patient know that we received the forms and also about the forms fee then when completed.

## 2025-05-03 DIAGNOSIS — F90.0 ADHD (ATTENTION DEFICIT HYPERACTIVITY DISORDER), INATTENTIVE TYPE: ICD-10-CM

## 2025-05-05 RX ORDER — DEXTROAMPHETAMINE SACCHARATE, AMPHETAMINE ASPARTATE MONOHYDRATE, DEXTROAMPHETAMINE SULFATE AND AMPHETAMINE SULFATE 5; 5; 5; 5 MG/1; MG/1; MG/1; MG/1
20 CAPSULE, EXTENDED RELEASE ORAL EVERY MORNING
Qty: 30 CAPSULE | Refills: 0 | Status: SHIPPED | OUTPATIENT
Start: 2025-05-05 | End: 2025-06-04

## 2025-05-05 NOTE — TELEPHONE ENCOUNTER
Forms given to STEFANIA Tovar will have to complete form, we can send appointment notes and letters.

## 2025-05-21 ENCOUNTER — OFFICE VISIT (OUTPATIENT)
Dept: UROLOGY | Facility: MEDICAL CENTER | Age: 35
End: 2025-05-21
Payer: COMMERCIAL

## 2025-05-21 VITALS
DIASTOLIC BLOOD PRESSURE: 78 MMHG | BODY MASS INDEX: 22.53 KG/M2 | HEART RATE: 82 BPM | WEIGHT: 157 LBS | OXYGEN SATURATION: 99 % | SYSTOLIC BLOOD PRESSURE: 128 MMHG

## 2025-05-21 DIAGNOSIS — R39.9 LOWER URINARY TRACT SYMPTOMS: Primary | ICD-10-CM

## 2025-05-21 LAB
POST-VOID RESIDUAL VOLUME, ML POC: 64 ML
SL AMB  POCT GLUCOSE, UA: NORMAL
SL AMB LEUKOCYTE ESTERASE,UA: NORMAL
SL AMB POCT BILIRUBIN,UA: NORMAL
SL AMB POCT BLOOD,UA: NORMAL
SL AMB POCT CLARITY,UA: CLEAR
SL AMB POCT COLOR,UA: YELLOW
SL AMB POCT KETONES,UA: NORMAL
SL AMB POCT NITRITE,UA: NORMAL
SL AMB POCT PH,UA: 6.5
SL AMB POCT SPECIFIC GRAVITY,UA: 1.01
SL AMB POCT URINE PROTEIN: NORMAL
SL AMB POCT UROBILINOGEN: 0.2

## 2025-05-21 PROCEDURE — 81003 URINALYSIS AUTO W/O SCOPE: CPT

## 2025-05-21 PROCEDURE — 99213 OFFICE O/P EST LOW 20 MIN: CPT

## 2025-05-21 PROCEDURE — 51798 US URINE CAPACITY MEASURE: CPT

## 2025-05-21 RX ORDER — TADALAFIL 5 MG/1
5 TABLET ORAL DAILY
Qty: 30 TABLET | Refills: 3 | Status: SHIPPED | OUTPATIENT
Start: 2025-05-21

## 2025-05-21 NOTE — PROGRESS NOTES
Name: Jaylen Angel      : 1990      MRN: [id number]  Encounter Provider: SAMEERA Yates  Encounter Date: 2025   Encounter department: [facility]OLOG[facility] [place]  :  Assessment & Plan  Lower urinary tract symptoms  Frequency, urgency, and urge incontinence.  UA today in the office negative for any signs of infection or blood.  PVR 64 mL.  Discussed medication options for symptoms including low-dose PDE 5 inhibitors versus Flomax to relax the bladder neck and promote emptying.  Side effect profile discussed.  Patient would like to try low-dose Cialis, prescription provided.  Recommend staying well-hydrated with plenty water, and decreasing intake of bladder irritants.  Follow-up in the office in 8 weeks.  Orders:    POCT urine dip auto non-scope    POCT Measure PVR    tadalafil (CIALIS) 5 MG tablet; Take 1 tablet (5 mg total) by mouth in the morning        History of Present Illness   Jaylen Angel is a 34 y.o. male who presents as a new patient for frequency, urgency, and urge incontinence.  He states that he feels like for a while he has had some frequency, sometimes more than once an hour. Sometimes he will urinate and then have to go again 15 minutes later. He also notes some urgency and Friday and Monday while driving to work he could not hold it and experienced urge incontinence.   He is drinking water, 1 coffee, some soda, and alcohol on the weekends.  He denies any dysuria, feelings of incomplete bladder, or blood in the urine.  He does report that his stream is strong, and denies any hesitancy.  No history of frequent UTIs.    Review of Systems   Constitutional:  Negative for chills and fever.   Gastrointestinal:  Negative for abdominal pain.   Genitourinary:  Positive for frequency and urgency. Negative for difficulty urinating, dysuria, flank pain and hematuria.          Objective   Wt 71.2 kg (157 lb)   BMI 22.53 kg/m²     Physical Exam  Vitals reviewed.   Constitutional:  "      General: He is not in acute distress.     Appearance: Normal appearance. He is normal weight. He is not toxic-appearing.   HENT:      Head: Normocephalic and atraumatic.     Eyes:      Conjunctiva/sclera: Conjunctivae normal.       Cardiovascular:      Rate and Rhythm: Normal rate.   Pulmonary:      Effort: Pulmonary effort is normal.   Abdominal:      Palpations: Abdomen is soft.     Musculoskeletal:         General: Normal range of motion.      Cervical back: Normal range of motion.     Skin:     General: Skin is warm and dry.     Neurological:      Mental Status: He is alert and oriented to person, place, and time.     Psychiatric:         Mood and Affect: Mood normal.         Behavior: Behavior normal.           Results   No results found for: \"PSA\"  Lab Results   Component Value Date    CALCIUM 9.7 07/03/2024    K 4.6 07/03/2024    CO2 26 07/03/2024     07/03/2024    BUN 12 07/03/2024    CREATININE 1.02 07/03/2024     Lab Results   Component Value Date    WBC 5.06 07/03/2024    HGB 14.3 07/03/2024    HCT 42.9 07/03/2024    MCV 91 07/03/2024     07/03/2024       Office Urine Dip  No results found for this or any previous visit (from the past hour).      "

## 2025-06-05 DIAGNOSIS — F90.0 ADHD (ATTENTION DEFICIT HYPERACTIVITY DISORDER), INATTENTIVE TYPE: ICD-10-CM

## 2025-06-06 RX ORDER — DEXTROAMPHETAMINE SACCHARATE, AMPHETAMINE ASPARTATE MONOHYDRATE, DEXTROAMPHETAMINE SULFATE AND AMPHETAMINE SULFATE 5; 5; 5; 5 MG/1; MG/1; MG/1; MG/1
20 CAPSULE, EXTENDED RELEASE ORAL EVERY MORNING
Qty: 30 CAPSULE | Refills: 0 | Status: SHIPPED | OUTPATIENT
Start: 2025-06-06 | End: 2025-07-06

## 2025-07-09 DIAGNOSIS — F41.1 GAD (GENERALIZED ANXIETY DISORDER): ICD-10-CM

## 2025-07-09 DIAGNOSIS — F90.0 ADHD (ATTENTION DEFICIT HYPERACTIVITY DISORDER), INATTENTIVE TYPE: ICD-10-CM

## 2025-07-09 DIAGNOSIS — F33.1 MODERATE EPISODE OF RECURRENT MAJOR DEPRESSIVE DISORDER (HCC): ICD-10-CM

## 2025-07-10 RX ORDER — DEXTROAMPHETAMINE SACCHARATE, AMPHETAMINE ASPARTATE MONOHYDRATE, DEXTROAMPHETAMINE SULFATE AND AMPHETAMINE SULFATE 5; 5; 5; 5 MG/1; MG/1; MG/1; MG/1
20 CAPSULE, EXTENDED RELEASE ORAL EVERY MORNING
Qty: 30 CAPSULE | Refills: 0 | Status: SHIPPED | OUTPATIENT
Start: 2025-07-10 | End: 2025-08-09

## 2025-07-10 RX ORDER — ESCITALOPRAM OXALATE 20 MG/1
20 TABLET ORAL DAILY
Qty: 90 TABLET | Refills: 0 | Status: SHIPPED | OUTPATIENT
Start: 2025-07-10 | End: 2025-09-08